# Patient Record
Sex: FEMALE | Race: WHITE | NOT HISPANIC OR LATINO | Employment: FULL TIME | URBAN - METROPOLITAN AREA
[De-identification: names, ages, dates, MRNs, and addresses within clinical notes are randomized per-mention and may not be internally consistent; named-entity substitution may affect disease eponyms.]

---

## 2019-03-08 ENCOUNTER — HOSPITAL ENCOUNTER (OUTPATIENT)
Dept: URGENT CARE | Facility: CLINIC | Age: 29
Discharge: HOME | End: 2019-03-08
Attending: FAMILY MEDICINE

## 2019-03-09 LAB — BACTERIA SPEC AEROBE CULT: NORMAL

## 2021-12-17 ENCOUNTER — HOSPITAL ENCOUNTER (EMERGENCY)
Facility: HOSPITAL | Age: 31
Discharge: HOME | End: 2021-12-17
Attending: EMERGENCY MEDICINE | Admitting: EMERGENCY MEDICINE

## 2021-12-17 VITALS
OXYGEN SATURATION: 98 % | HEIGHT: 64 IN | WEIGHT: 278.22 LBS | HEART RATE: 72 BPM | SYSTOLIC BLOOD PRESSURE: 141 MMHG | DIASTOLIC BLOOD PRESSURE: 95 MMHG | TEMPERATURE: 98.3 F | BODY MASS INDEX: 47.5 KG/M2 | RESPIRATION RATE: 20 BRPM

## 2021-12-17 DIAGNOSIS — M54.16 LUMBAR RADICULOPATHY: Primary | ICD-10-CM

## 2021-12-17 PROCEDURE — 99283 EMERGENCY DEPT VISIT LOW MDM: CPT

## 2021-12-17 PROCEDURE — 25010000002 DEXAMETHASONE PER 1 MG: Performed by: PHYSICIAN ASSISTANT

## 2021-12-17 PROCEDURE — 25010000002 KETOROLAC TROMETHAMINE PER 15 MG: Performed by: PHYSICIAN ASSISTANT

## 2021-12-17 PROCEDURE — 96372 THER/PROPH/DIAG INJ SC/IM: CPT

## 2021-12-17 RX ORDER — CYCLOBENZAPRINE HCL 10 MG
10 TABLET ORAL ONCE
Status: COMPLETED | OUTPATIENT
Start: 2021-12-17 | End: 2021-12-17

## 2021-12-17 RX ORDER — KETOROLAC TROMETHAMINE 30 MG/ML
30 INJECTION, SOLUTION INTRAMUSCULAR; INTRAVENOUS ONCE
Status: COMPLETED | OUTPATIENT
Start: 2021-12-17 | End: 2021-12-17

## 2021-12-17 RX ORDER — DEXAMETHASONE SODIUM PHOSPHATE 10 MG/ML
10 INJECTION INTRAMUSCULAR; INTRAVENOUS ONCE
Status: COMPLETED | OUTPATIENT
Start: 2021-12-17 | End: 2021-12-17

## 2021-12-17 RX ORDER — HYDROCODONE BITARTRATE AND ACETAMINOPHEN 10; 325 MG/1; MG/1
1 TABLET ORAL EVERY 6 HOURS PRN
Status: DISCONTINUED | OUTPATIENT
Start: 2021-12-17 | End: 2021-12-17 | Stop reason: HOSPADM

## 2021-12-17 RX ORDER — PREDNISONE 20 MG/1
20 TABLET ORAL DAILY
Qty: 5 TABLET | Refills: 0 | OUTPATIENT
Start: 2021-12-17 | End: 2022-01-26

## 2021-12-17 RX ORDER — CYCLOBENZAPRINE HCL 10 MG
10 TABLET ORAL 2 TIMES DAILY PRN
Qty: 30 TABLET | Refills: 0 | Status: SHIPPED | OUTPATIENT
Start: 2021-12-17 | End: 2022-03-03

## 2021-12-17 RX ORDER — KETOROLAC TROMETHAMINE 10 MG/1
10 TABLET, FILM COATED ORAL EVERY 6 HOURS PRN
Qty: 15 TABLET | Refills: 0 | Status: SHIPPED | OUTPATIENT
Start: 2021-12-17 | End: 2022-01-26

## 2021-12-17 RX ADMIN — DEXAMETHASONE SODIUM PHOSPHATE 10 MG: 10 INJECTION INTRAMUSCULAR; INTRAVENOUS at 18:01

## 2021-12-17 RX ADMIN — HYDROCODONE BITARTRATE AND ACETAMINOPHEN 1 TABLET: 10; 325 TABLET ORAL at 18:34

## 2021-12-17 RX ADMIN — CYCLOBENZAPRINE 10 MG: 10 TABLET, FILM COATED ORAL at 18:01

## 2021-12-17 RX ADMIN — KETOROLAC TROMETHAMINE 30 MG: 30 INJECTION, SOLUTION INTRAMUSCULAR; INTRAVENOUS at 18:02

## 2021-12-17 NOTE — ED PROVIDER NOTES
Subjective   Complains of right lower back pain/buttocks pain and sciatica. Hx of back surgery before L5/S1 Discectomy previously.  Denies urinary symptoms/saddle anesthesia like symptoms. Tried tylenol and ibuprofen at home and stretching without relief.       History provided by:  Patient  Back Pain  Location:  Lumbar spine  Quality:  Burning  Radiates to:  L foot  Pain severity:  Moderate  Onset quality:  Gradual  Duration:  3 days  Timing:  Intermittent  Progression:  Worsening  Chronicity:  Recurrent  Relieved by:  Nothing  Worsened by:  Nothing  Associated symptoms: no fever        Review of Systems   Constitutional: Negative for appetite change, chills, fatigue and fever.   HENT: Negative.    Eyes: Negative.  Negative for photophobia.   Respiratory: Negative.    Gastrointestinal: Negative.    Endocrine: Negative.    Genitourinary: Negative.    Musculoskeletal: Positive for back pain.   Skin: Negative.    Allergic/Immunologic: Negative.  Negative for immunocompromised state.   Neurological: Negative.    Hematological: Negative.    Psychiatric/Behavioral: Negative.    All other systems reviewed and are negative.      History reviewed. No pertinent past medical history.    No Known Allergies    Past Surgical History:   Procedure Laterality Date   • BACK SURGERY     • TONSILLECTOMY         History reviewed. No pertinent family history.    Social History     Socioeconomic History   • Marital status: Single   Tobacco Use   • Smoking status: Never Smoker   • Smokeless tobacco: Never Used   Vaping Use   • Vaping Use: Every day   Substance and Sexual Activity   • Alcohol use: Yes     Comment: Occasional   • Drug use: Never       Objective   Physical Exam  Vitals and nursing note reviewed.   Constitutional:       General: She is not in acute distress.     Appearance: Normal appearance. She is not ill-appearing or toxic-appearing.   HENT:      Head: Normocephalic.   Cardiovascular:      Rate and Rhythm: Normal rate and  regular rhythm.      Heart sounds: Normal heart sounds.   Pulmonary:      Effort: Pulmonary effort is normal.      Breath sounds: Normal breath sounds.   Musculoskeletal:        Arms:       Lumbar back: Tenderness present. Decreased range of motion.   Neurological:      Mental Status: She is alert.           MDM  Number of Diagnoses or Management Options  Risk of Complications, Morbidity, and/or Mortality  Presenting problems: moderate  Diagnostic procedures: moderate  Management options: moderate    Patient Progress  Patient progress: stable      Final diagnoses:   Lumbar radiculopathy       ED Disposition  ED Disposition     ED Disposition Condition Comment    Discharge Stable           Ree Cheek, JEROMY  [address] [address] [phone]    Schedule an appointment as soon as possible for a visit in 1 week  for further eval         Medication List      New Prescriptions    cyclobenzaprine 10 MG tablet  Commonly known as: FLEXERIL  Take 1 tablet by mouth 2 (Two) Times a Day As Needed for Muscle Spasms.     ketorolac 10 MG tablet  Commonly known as: TORADOL  Take 1 tablet by mouth Every 6 (Six) Hours As Needed for Moderate Pain .     predniSONE 20 MG tablet  Commonly known as: DELTASONE  Take 1 tablet by mouth Daily.           Where to Get Your Medications      These medications [place] [facility] [place] [id number] [facility] AT [facility] [address] - [phone] [facility] [phone] [address] [address]    Phone: [phone]   · cyclobenzaprine 10 MG tablet  · ketorolac 10 MG tablet  · predniSONE 20 MG tablet          Micah Alfonso PA  12/17/21 7795

## 2022-01-26 PROCEDURE — U0004 COV-19 TEST NON-CDC HGH THRU: HCPCS | Performed by: NURSE PRACTITIONER

## 2022-01-27 ENCOUNTER — TELEPHONE (OUTPATIENT)
Dept: URGENT CARE | Facility: CLINIC | Age: 32
End: 2022-01-27

## 2022-01-27 NOTE — TELEPHONE ENCOUNTER
----- Message from Narinder Mead MD sent at 1/27/2022  3:33 PM EST -----  Please call the patient regarding negative covid

## 2022-01-28 ENCOUNTER — HOSPITAL ENCOUNTER (EMERGENCY)
Facility: HOSPITAL | Age: 32
Discharge: HOME | End: 2022-01-28
Attending: EMERGENCY MEDICINE | Admitting: EMERGENCY MEDICINE

## 2022-01-28 ENCOUNTER — APPOINTMENT (OUTPATIENT)
Dept: GENERAL RADIOLOGY | Facility: HOSPITAL | Age: 32
End: 2022-01-28

## 2022-01-28 VITALS
RESPIRATION RATE: 18 BRPM | DIASTOLIC BLOOD PRESSURE: 86 MMHG | OXYGEN SATURATION: 98 % | HEIGHT: 64 IN | TEMPERATURE: 98 F | BODY MASS INDEX: 47.65 KG/M2 | WEIGHT: 279.1 LBS | SYSTOLIC BLOOD PRESSURE: 118 MMHG | HEART RATE: 70 BPM

## 2022-01-28 DIAGNOSIS — Z20.822 SUSPECTED COVID-19 VIRUS INFECTION: ICD-10-CM

## 2022-01-28 DIAGNOSIS — B34.9 VIRAL SYNDROME: Primary | ICD-10-CM

## 2022-01-28 LAB
ALBUMIN SERPL-MCNC: 4.7 G/DL (ref 3.5–5.2)
ALBUMIN/GLOB SERPL: 1.5 G/DL
ALP SERPL-CCNC: 76 U/L (ref 39–117)
ALT SERPL W P-5'-P-CCNC: 25 U/L (ref 1–33)
ANION GAP SERPL CALCULATED.3IONS-SCNC: 13.5 MMOL/L (ref 5–15)
AST SERPL-CCNC: 18 U/L (ref 1–32)
BASOPHILS # BLD AUTO: 0.02 10*3/MM3 (ref 0–0.2)
BASOPHILS NFR BLD AUTO: 0.2 % (ref 0–1.5)
BILIRUB SERPL-MCNC: 0.2 MG/DL (ref 0–1.2)
BUN SERPL-MCNC: 19 MG/DL (ref 6–20)
BUN/CREAT SERPL: 28.4 (ref 7–25)
CALCIUM SPEC-SCNC: 10.2 MG/DL (ref 8.6–10.5)
CHLORIDE SERPL-SCNC: 105 MMOL/L (ref 98–107)
CO2 SERPL-SCNC: 18.5 MMOL/L (ref 22–29)
CREAT SERPL-MCNC: 0.67 MG/DL (ref 0.57–1)
DEPRECATED RDW RBC AUTO: 41.8 FL (ref 37–54)
EOSINOPHIL # BLD AUTO: 0 10*3/MM3 (ref 0–0.4)
EOSINOPHIL NFR BLD AUTO: 0 % (ref 0.3–6.2)
ERYTHROCYTE [DISTWIDTH] IN BLOOD BY AUTOMATED COUNT: 14.1 % (ref 12.3–15.4)
FLUAV AG NPH QL: NEGATIVE
FLUBV AG NPH QL IA: NEGATIVE
GFR SERPL CREATININE-BSD FRML MDRD: 103 ML/MIN/1.73
GLOBULIN UR ELPH-MCNC: 3.2 GM/DL
GLUCOSE SERPL-MCNC: 124 MG/DL (ref 65–99)
HCT VFR BLD AUTO: 38.8 % (ref 34–46.6)
HGB BLD-MCNC: 13.1 G/DL (ref 12–15.9)
HOLD SPECIMEN: NORMAL
HOLD SPECIMEN: NORMAL
IMM GRANULOCYTES # BLD AUTO: 0.18 10*3/MM3 (ref 0–0.05)
IMM GRANULOCYTES NFR BLD AUTO: 2 % (ref 0–0.5)
LYMPHOCYTES # BLD AUTO: 1.19 10*3/MM3 (ref 0.7–3.1)
LYMPHOCYTES NFR BLD AUTO: 13.4 % (ref 19.6–45.3)
MCH RBC QN AUTO: 27.7 PG (ref 26.6–33)
MCHC RBC AUTO-ENTMCNC: 33.8 G/DL (ref 31.5–35.7)
MCV RBC AUTO: 82 FL (ref 79–97)
MONOCYTES # BLD AUTO: 0.13 10*3/MM3 (ref 0.1–0.9)
MONOCYTES NFR BLD AUTO: 1.5 % (ref 5–12)
NEUTROPHILS NFR BLD AUTO: 7.33 10*3/MM3 (ref 1.7–7)
NEUTROPHILS NFR BLD AUTO: 82.9 % (ref 42.7–76)
NRBC BLD AUTO-RTO: 0 /100 WBC (ref 0–0.2)
PLATELET # BLD AUTO: 198 10*3/MM3 (ref 140–450)
PMV BLD AUTO: 11.7 FL (ref 6–12)
POTASSIUM SERPL-SCNC: 4 MMOL/L (ref 3.5–5.2)
PROT SERPL-MCNC: 7.9 G/DL (ref 6–8.5)
RBC # BLD AUTO: 4.73 10*6/MM3 (ref 3.77–5.28)
SARS-COV-2 RNA PNL SPEC NAA+PROBE: NOT DETECTED
SODIUM SERPL-SCNC: 137 MMOL/L (ref 136–145)
WBC NRBC COR # BLD: 8.85 10*3/MM3 (ref 3.4–10.8)
WHOLE BLOOD HOLD SPECIMEN: NORMAL
WHOLE BLOOD HOLD SPECIMEN: NORMAL

## 2022-01-28 PROCEDURE — 87804 INFLUENZA ASSAY W/OPTIC: CPT | Performed by: EMERGENCY MEDICINE

## 2022-01-28 PROCEDURE — 71045 X-RAY EXAM CHEST 1 VIEW: CPT

## 2022-01-28 PROCEDURE — U0004 COV-19 TEST NON-CDC HGH THRU: HCPCS | Performed by: NURSE PRACTITIONER

## 2022-01-28 PROCEDURE — 80053 COMPREHEN METABOLIC PANEL: CPT

## 2022-01-28 PROCEDURE — 99283 EMERGENCY DEPT VISIT LOW MDM: CPT

## 2022-01-28 PROCEDURE — 85025 COMPLETE CBC W/AUTO DIFF WBC: CPT

## 2022-01-28 PROCEDURE — 94640 AIRWAY INHALATION TREATMENT: CPT

## 2022-01-28 RX ORDER — SODIUM CHLORIDE 0.9 % (FLUSH) 0.9 %
10 SYRINGE (ML) INJECTION AS NEEDED
Status: DISCONTINUED | OUTPATIENT
Start: 2022-01-28 | End: 2022-01-28

## 2022-01-28 RX ORDER — IPRATROPIUM BROMIDE AND ALBUTEROL SULFATE 2.5; .5 MG/3ML; MG/3ML
3 SOLUTION RESPIRATORY (INHALATION) ONCE
Status: COMPLETED | OUTPATIENT
Start: 2022-01-28 | End: 2022-01-28

## 2022-01-28 RX ORDER — BENZONATATE 200 MG/1
200 CAPSULE ORAL 3 TIMES DAILY PRN
Qty: 20 CAPSULE | Refills: 0 | Status: SHIPPED | OUTPATIENT
Start: 2022-01-28 | End: 2022-03-03

## 2022-01-28 RX ORDER — ALBUTEROL SULFATE 2.5 MG/3ML
2.5 SOLUTION RESPIRATORY (INHALATION) EVERY 4 HOURS PRN
Qty: 30 EACH | Refills: 0 | Status: SHIPPED | OUTPATIENT
Start: 2022-01-28 | End: 2022-03-03

## 2022-01-28 RX ADMIN — IPRATROPIUM BROMIDE AND ALBUTEROL SULFATE 3 ML: .5; 3 SOLUTION RESPIRATORY (INHALATION) at 17:24

## 2022-02-17 ENCOUNTER — OFFICE VISIT (OUTPATIENT)
Dept: NEUROSURGERY | Facility: CLINIC | Age: 32
End: 2022-02-17

## 2022-02-17 ENCOUNTER — PREP FOR SURGERY (OUTPATIENT)
Dept: OTHER | Facility: HOSPITAL | Age: 32
End: 2022-02-17

## 2022-02-17 VITALS — BODY MASS INDEX: 47.97 KG/M2 | HEIGHT: 64 IN | WEIGHT: 281 LBS

## 2022-02-17 DIAGNOSIS — M51.36 DEGENERATIVE DISC DISEASE, LUMBAR: ICD-10-CM

## 2022-02-17 DIAGNOSIS — M54.16 LUMBAR RADICULOPATHY: ICD-10-CM

## 2022-02-17 DIAGNOSIS — M51.26 LUMBAR DISC HERNIATION: Primary | ICD-10-CM

## 2022-02-17 PROCEDURE — 99215 OFFICE O/P EST HI 40 MIN: CPT | Performed by: NURSE PRACTITIONER

## 2022-02-17 RX ORDER — CEFAZOLIN SODIUM IN 0.9 % NACL 3 G/100 ML
3 INTRAVENOUS SOLUTION, PIGGYBACK (ML) INTRAVENOUS ONCE
Status: CANCELLED | OUTPATIENT
Start: 2022-02-17 | End: 2022-02-17

## 2022-02-17 RX ORDER — HYDROCODONE BITARTRATE AND ACETAMINOPHEN 5; 325 MG/1; MG/1
1 TABLET ORAL EVERY 6 HOURS PRN
Qty: 28 TABLET | Refills: 0 | Status: SHIPPED | OUTPATIENT
Start: 2022-02-17 | End: 2022-03-01 | Stop reason: SDUPTHER

## 2022-02-17 NOTE — PROGRESS NOTES
"Chief Complaint  Back Pain    Subjective          Jenni Reid who is a 31 y.o. year old female who presents to [facility] for evaluation of low back and right leg pain.     Patient presenting with concerns of low back and right leg pain, starting in December of 2021. She was sick with COVID, woke up with severe pain in the low back which then radiated into the right leg. Pt rates pain 8/10, which is constant. Increased activity, twisting, turning, bending will make the pain severe. Described as aching and throbbing. Pain does radiate into the right leg in a L5 distribution. Having pain into the lateral shin, numbness in the top of the foot. Pt has concerns of weakness in the foot, with limited dorsiflexion. Pt having urinary incontinence, has sensation. Denies problems with bowels.     Recent Interventions for Pain: Medication Management which was not very effective. She has taken Ibuprofen, was prescribed Diclofenac though this caused significant abdominal discomfort and she stopped this. She was prescribed robaxin which did not provide benefit. Currently taking Tylenol though this does not help much.     Prior Surgery: Prior discectomies at L4/5 and L5/S1 last in 2016 with Dr. Norwood, repeat discectomy L4/5 in 2016 with Dr. Aguilera    Pt is morbidly obese. BMI 48.23. She is a former smoker. She currently vapes.               Review of Systems   Genitourinary: Positive for urinary incontinence.   Musculoskeletal: Positive for arthralgias, back pain and gait problem.   Neurological: Positive for weakness and numbness.   All other systems reviewed and are negative.       Objective   Vital Signs:   Ht 162.6 cm (64\")   Wt 127 kg (281 lb)   BMI 48.23 kg/m²       Physical Exam  Vitals reviewed.   Constitutional:       Appearance: Normal appearance.   Musculoskeletal:      Lumbar back: Tenderness present. Positive right straight leg raise test. Negative left straight leg raise " test.   Neurological:      Mental Status: She is alert and oriented to person, place, and time.      Gait: Gait is intact.      Deep Tendon Reflexes:      Reflex Scores:       Patellar reflexes are 0 on the right side and 2+ on the left side.       Achilles reflexes are 2+ on the right side and 0 on the left side.       Neurologic Exam     Mental Status   Oriented to person, place, and time.   Level of consciousness: alert    Motor Exam   Muscle bulk: normal  Overall muscle tone: normal    Strength   Strength 5/5 except as noted.   Right peroneal: 4/5  Right gastroc: 3/5    Sensory Exam   Sensory deficit distribution on right: L5    Gait, Coordination, and Reflexes     Gait  Gait: normal    Reflexes   Right patellar: 0  Left patellar: 2+  Right achilles: 2+  Left achilles: 0       Result Review :{Labs  Result Review  Imaging  Med Tab  Media  Procedures :23}       Data reviewed: Radiologic studies MRI Lumbar Spine at [facility] on 1/24/22 reviewed. Large paracentral and foraminal disc protrusion measuring 2.4 cm resulting in severe right lateral recess stenosis and right foraminal stenosis with possible nerve impingement. At L5/S1 there is DDD with a disc protrusion without canal stenosis or nerve impingement.           Assessment and Plan    Diagnoses and all orders for this visit:    1. Lumbar disc herniation (Primary)  -     HYDROcodone-acetaminophen (NORCO) 5-325 MG per tablet; Take 1 tablet by mouth Every 6 (Six) Hours As Needed for Severe Pain .  Dispense: 28 tablet; Refill: 0    2. Lumbar radiculopathy    3. Degenerative disc disease, lumbar    Pt presenting with acute right lumbar radiculopathy with weakness and sensory deficit in the right lower extremity. We reviewed her MRI Lumbar Spine, revealing a large right paracentral and foraminal disc protrusion at L4/5. We discussed right approach minimally invasive discectomy at L4/5. We discussed 6 week recovery time of no lifting greater than 10  pounds and movement limitations. Dr. Smith discussed risks and benefits with patient. Dr. Smith discussed at length that she is high risk for recurrent disc herniation and complications due to morbid obesity. Dr. Smith discussed that he would recommend lumbar fusion though she would need to lose weight prior to consideration of this. Patient wishes to proceed with discectomy. Will start Norco 5/325 mg every 6 hours as needed for pain. Liang reviewed. She will follow up at 3 weeks post op.     I spent 45 minutes caring for Jenni on this date of service. This time includes time spent by me in the following activities:preparing for the visit, reviewing tests, obtaining and/or reviewing a separately obtained history, performing a medically appropriate examination and/or evaluation , counseling and educating the patient/family/caregiver, ordering medications, tests, or procedures, referring and communicating with other health care professionals , documenting information in the medical record, independently interpreting results and communicating that information with the patient/family/caregiver and care coordination.    Follow Up   Return for Post-op Follow up.  Patient was given instructions and counseling regarding her condition or for health maintenance advice.

## 2022-02-17 NOTE — PATIENT INSTRUCTIONS
-Norco 5/325 mg every 6 hours as needed for pain  -Right approach minimally invasive discectomy L4/L5  -Follow up post op

## 2022-02-23 PROBLEM — M51.26 LUMBAR DISC HERNIATION: Status: ACTIVE | Noted: 2022-02-23

## 2022-02-28 ENCOUNTER — TELEPHONE (OUTPATIENT)
Dept: NEUROSURGERY | Facility: CLINIC | Age: 32
End: 2022-02-28

## 2022-02-28 NOTE — TELEPHONE ENCOUNTER
PATIENT RAN OUT OF PAIN MEDS THIS MORNING AND HER SURGERY IS NOT SCHEDULED UNTIL NEXT WED.  PATIENT WOULD LIKE TO KNOW IF SHE CAN GET A REFIL;L UNTIL THEN.    HYDROCODONE 5/325MG    PLEASE CALL PATIENT AND ADVISE IF POSSIBLE    [phone]

## 2022-03-01 DIAGNOSIS — M51.26 LUMBAR DISC HERNIATION: ICD-10-CM

## 2022-03-01 RX ORDER — HYDROCODONE BITARTRATE AND ACETAMINOPHEN 5; 325 MG/1; MG/1
1 TABLET ORAL EVERY 6 HOURS PRN
Qty: 28 TABLET | Refills: 0 | Status: SHIPPED | OUTPATIENT
Start: 2022-03-01 | End: 2022-03-09 | Stop reason: HOSPADM

## 2022-03-01 NOTE — TELEPHONE ENCOUNTER
PT CALLED TO CHECK STATUS OF HER REFILL FOR HER PAIN MEDICATION-PT STATES SHE WAS TOLD SHE WOULD RECEIVE A CALL BACK-PT IS CALLING THE PHARMACY TO SEE IF MEDICATION WAS FILLED-PT WILL CALL BACK IF NEEDED.

## 2022-03-03 ENCOUNTER — TELEPHONE (OUTPATIENT)
Dept: NEUROSURGERY | Facility: CLINIC | Age: 32
End: 2022-03-03

## 2022-03-03 ENCOUNTER — OFFICE VISIT (OUTPATIENT)
Dept: FAMILY MEDICINE CLINIC | Facility: CLINIC | Age: 32
End: 2022-03-03

## 2022-03-03 ENCOUNTER — LAB (OUTPATIENT)
Dept: LAB | Facility: HOSPITAL | Age: 32
End: 2022-03-03

## 2022-03-03 VITALS
WEIGHT: 279.2 LBS | TEMPERATURE: 98 F | SYSTOLIC BLOOD PRESSURE: 132 MMHG | RESPIRATION RATE: 16 BRPM | HEART RATE: 90 BPM | OXYGEN SATURATION: 98 % | HEIGHT: 64 IN | BODY MASS INDEX: 47.66 KG/M2 | DIASTOLIC BLOOD PRESSURE: 88 MMHG

## 2022-03-03 DIAGNOSIS — Z01.89 ROUTINE LAB DRAW: ICD-10-CM

## 2022-03-03 DIAGNOSIS — Z00.00 ANNUAL PHYSICAL EXAM: ICD-10-CM

## 2022-03-03 DIAGNOSIS — Z11.59 ENCOUNTER FOR HEPATITIS C SCREENING TEST FOR LOW RISK PATIENT: ICD-10-CM

## 2022-03-03 DIAGNOSIS — E28.2 PCOS (POLYCYSTIC OVARIAN SYNDROME): ICD-10-CM

## 2022-03-03 DIAGNOSIS — F41.9 ANXIETY: Primary | ICD-10-CM

## 2022-03-03 DIAGNOSIS — R53.83 FATIGUE, UNSPECIFIED TYPE: ICD-10-CM

## 2022-03-03 DIAGNOSIS — R73.01 IMPAIRED FASTING GLUCOSE: ICD-10-CM

## 2022-03-03 DIAGNOSIS — Z13.220 SCREENING FOR LIPID DISORDERS: ICD-10-CM

## 2022-03-03 DIAGNOSIS — F33.1 MODERATE EPISODE OF RECURRENT MAJOR DEPRESSIVE DISORDER: ICD-10-CM

## 2022-03-03 PROBLEM — M51.26 HNP (HERNIATED NUCLEUS PULPOSUS), LUMBAR: Status: ACTIVE | Noted: 2017-09-25

## 2022-03-03 PROBLEM — M54.30 SCIATICA: Status: ACTIVE | Noted: 2022-03-03

## 2022-03-03 PROBLEM — R32 URINARY INCONTINENCE: Status: ACTIVE | Noted: 2022-03-03

## 2022-03-03 LAB
ALBUMIN SERPL-MCNC: 4.5 G/DL (ref 3.5–5.2)
ALBUMIN/GLOB SERPL: 1.9 G/DL
ALP SERPL-CCNC: 59 U/L (ref 39–117)
ALT SERPL W P-5'-P-CCNC: 30 U/L (ref 1–33)
ANION GAP SERPL CALCULATED.3IONS-SCNC: 9.6 MMOL/L (ref 5–15)
AST SERPL-CCNC: 22 U/L (ref 1–32)
BASOPHILS # BLD AUTO: 0.04 10*3/MM3 (ref 0–0.2)
BASOPHILS NFR BLD AUTO: 0.6 % (ref 0–1.5)
BILIRUB SERPL-MCNC: 0.3 MG/DL (ref 0–1.2)
BUN SERPL-MCNC: 14 MG/DL (ref 6–20)
BUN/CREAT SERPL: 16.9 (ref 7–25)
CALCIUM SPEC-SCNC: 9.4 MG/DL (ref 8.6–10.5)
CHLORIDE SERPL-SCNC: 107 MMOL/L (ref 98–107)
CHOLEST SERPL-MCNC: 152 MG/DL (ref 0–200)
CO2 SERPL-SCNC: 21.4 MMOL/L (ref 22–29)
CREAT SERPL-MCNC: 0.83 MG/DL (ref 0.57–1)
DEPRECATED RDW RBC AUTO: 43.3 FL (ref 37–54)
EGFRCR SERPLBLD CKD-EPI 2021: 97.4 ML/MIN/1.73
EOSINOPHIL # BLD AUTO: 0.2 10*3/MM3 (ref 0–0.4)
EOSINOPHIL NFR BLD AUTO: 3.1 % (ref 0.3–6.2)
ERYTHROCYTE [DISTWIDTH] IN BLOOD BY AUTOMATED COUNT: 14.2 % (ref 12.3–15.4)
GLOBULIN UR ELPH-MCNC: 2.4 GM/DL
GLUCOSE SERPL-MCNC: 92 MG/DL (ref 65–99)
HBA1C MFR BLD: 5.2 % (ref 4.8–5.6)
HCT VFR BLD AUTO: 40.3 % (ref 34–46.6)
HCV AB SER DONR QL: NORMAL
HDLC SERPL-MCNC: 43 MG/DL (ref 40–60)
HGB BLD-MCNC: 13.2 G/DL (ref 12–15.9)
IMM GRANULOCYTES # BLD AUTO: 0.02 10*3/MM3 (ref 0–0.05)
IMM GRANULOCYTES NFR BLD AUTO: 0.3 % (ref 0–0.5)
LDLC SERPL CALC-MCNC: 89 MG/DL (ref 0–100)
LDLC/HDLC SERPL: 2.03 {RATIO}
LYMPHOCYTES # BLD AUTO: 2.13 10*3/MM3 (ref 0.7–3.1)
LYMPHOCYTES NFR BLD AUTO: 33.5 % (ref 19.6–45.3)
MCH RBC QN AUTO: 27.5 PG (ref 26.6–33)
MCHC RBC AUTO-ENTMCNC: 32.8 G/DL (ref 31.5–35.7)
MCV RBC AUTO: 84 FL (ref 79–97)
MONOCYTES # BLD AUTO: 0.3 10*3/MM3 (ref 0.1–0.9)
MONOCYTES NFR BLD AUTO: 4.7 % (ref 5–12)
NEUTROPHILS NFR BLD AUTO: 3.66 10*3/MM3 (ref 1.7–7)
NEUTROPHILS NFR BLD AUTO: 57.8 % (ref 42.7–76)
NRBC BLD AUTO-RTO: 0 /100 WBC (ref 0–0.2)
PLATELET # BLD AUTO: 216 10*3/MM3 (ref 140–450)
PMV BLD AUTO: 12.2 FL (ref 6–12)
POTASSIUM SERPL-SCNC: 4.5 MMOL/L (ref 3.5–5.2)
PROT SERPL-MCNC: 6.9 G/DL (ref 6–8.5)
RBC # BLD AUTO: 4.8 10*6/MM3 (ref 3.77–5.28)
SODIUM SERPL-SCNC: 138 MMOL/L (ref 136–145)
TRIGL SERPL-MCNC: 108 MG/DL (ref 0–150)
TSH SERPL DL<=0.05 MIU/L-ACNC: 1.92 UIU/ML (ref 0.27–4.2)
VLDLC SERPL-MCNC: 20 MG/DL (ref 5–40)
WBC NRBC COR # BLD: 6.35 10*3/MM3 (ref 3.4–10.8)

## 2022-03-03 PROCEDURE — 36415 COLL VENOUS BLD VENIPUNCTURE: CPT

## 2022-03-03 PROCEDURE — 86803 HEPATITIS C AB TEST: CPT

## 2022-03-03 PROCEDURE — 99204 OFFICE O/P NEW MOD 45 MIN: CPT

## 2022-03-03 PROCEDURE — 84443 ASSAY THYROID STIM HORMONE: CPT

## 2022-03-03 PROCEDURE — 80061 LIPID PANEL: CPT

## 2022-03-03 PROCEDURE — 83036 HEMOGLOBIN GLYCOSYLATED A1C: CPT

## 2022-03-03 PROCEDURE — 80053 COMPREHEN METABOLIC PANEL: CPT

## 2022-03-03 PROCEDURE — 85025 COMPLETE CBC W/AUTO DIFF WBC: CPT

## 2022-03-03 RX ORDER — HYDROXYZINE HYDROCHLORIDE 25 MG/1
25 TABLET, FILM COATED ORAL 3 TIMES DAILY PRN
Qty: 90 TABLET | Refills: 2 | Status: SHIPPED | OUTPATIENT
Start: 2022-03-03 | End: 2022-09-01 | Stop reason: SDUPTHER

## 2022-03-03 NOTE — PRE-PROCEDURE INSTRUCTIONS
PRE-OP INSTRUCTIONS REVIEWED WITH PATIENT: FASTING/BATHING/ARRIVAL PROCEDURES.  INSTRUCTED TO TAKE A.M. DAY OF SURGERY: OVIDIO TERAN  UNDERSTANDING VERBALIZED.

## 2022-03-03 NOTE — PROGRESS NOTES
Jenni Reid presents to [facility] with complaints of worsening anxiety/depression, upcoming surgery for lower back pain, also increased fatigue and would like some labs checked.  Patient is also here to establish as new patient.      History of Present Illness  This is a 31-year-old female, past medical history significant for lower back pain, anxiety, depression, PCOS, who presents to the clinic with complaints of worsening anxiety/depression, upcoming surgery for her lower back pain, and also increased fatigue.    Lower back pain: Patient states that she is battled with this for several years, states that she is about to have her third back surgery by Dr. Smith next Wednesday, and that hopefully she will have some relieved symptoms by that time.  Patient states she is tried several other things for this, has been seeing neurosurgery for years.  Patient is currently [facility], states that it does okay, but she is ready to see how the surgery does.    Anxiety/depression: Patient states that she has had a really rough few days, states that her brother did try to commit suicide yesterday, and since that time she is really had some issues.  Patient states before that though she was wanting to possibly discuss about getting on Zoloft as she has had worsening depression/anxiety.  Patient has tried Wellbutrin in the past, did not do well with this, states that it made her crazy.  Patient also was wondering if she could have something in the meantime in order to help with the acute anxiety/panic attacks.  Patient denies any suicidal thoughts/ideation.    PCOS: Patient states that she is had issues with this in the past also, at one time was started on Metformin, states she did not do well with this.  Patient states she had severe GI issues as well as it made her periods very irregular and almost nonexistent.  Patient states since taking this, she has never had a regular period, states  that her periods will last 2 to 3 days, and it is more like spotting, and then that will be it.  Patient denies any severe pelvic pain, or other symptoms.  Patient has not seen a OB/GYN in some time.    Patient also states that she is got a pretty extensive family history of thyroid problems.  Patient's not had a full panel labs drawn in the past 3 to 4 years.  Patient does admit to some extreme fatigue, states that she does have also some issues with intolerance to cold as well as having a hard time losing weight.  Patient would like us to draw full panel for this.    Patient states that she is not due for another Pap smear until 2024, does not wish to have the flu vaccine, will get the tetanus shot at her next visit as well as the Covid shot her next visit.  Otherwise patient is up-to-date.    Past Medical History:   Diagnosis Date   • Allergic    • Anxiety    • Depression    • Injury of back     LUMBAR 4/5   • PCOS (polycystic ovarian syndrome)    • Sciatica of right side    • Seasonal allergies      Past Surgical History:   • ADENOIDECTOMY   • BACK SURGERY    DISCECTOMY X2 LUMBAR 5 S1   • TONSILLECTOMY       Social History     Socioeconomic History   • Marital status: Single   Tobacco Use   • Smoking status: Former Smoker     Packs/day: 1.00     Years: 10.00     Pack years: 10.00     Types: Cigarettes     Quit date: 1/1/2019     Years since quitting: 3.1   • Smokeless tobacco: Never Used   • Tobacco comment: Pt quit smoking 2019   Vaping Use   • Vaping Use: Every day   • Substances: Nicotine, INST PER ANESTHESIA PROTOCOL   • Devices: Disposable   Substance and Sexual Activity   • Alcohol use: Yes     Comment: Occasional   • Drug use: Never   • Sexual activity: Yes     Partners: Female     Socioeconomic History   • Marital status: Single   Tobacco Use   • Smoking status: Former Smoker     Packs/day: 1.00     Years: 10.00     Pack years: 10.00     Types: Cigarettes     Quit date: 1/1/2019     Years since quitting:  "3.1   • Smokeless tobacco: Never Used   • Tobacco comment: Pt quit smoking 2019   Vaping Use   • Vaping Use: Every day   • Substances: Nicotine, INST PER ANESTHESIA PROTOCOL   • Devices: Disposable   Substance and Sexual Activity   • Alcohol use: Yes     Comment: Occasional   • Drug use: Never   • Sexual activity: Yes     Partners: Female      Problem Relation Age of Onset   • Malig Hyperthermia Neg Hx          Objective   Vital Signs:   /88   Pulse 90   Temp 98 °F (36.7 °C)   Resp 16   Ht 162.6 cm (64\")   Wt 127 kg (279 lb 3.2 oz)   SpO2 98%   BMI 47.92 kg/m²     Body mass index is 47.92 kg/m².    All labs, imaging, test results, and specialty provider notes reviewed with patient.       Physical Exam  Vitals reviewed.   Constitutional:       Appearance: Normal appearance.   Cardiovascular:      Rate and Rhythm: Normal rate and regular rhythm.      Pulses: Normal pulses.      Heart sounds: Normal heart sounds.   Pulmonary:      Effort: Pulmonary effort is normal.      Breath sounds: Normal breath sounds.   Neurological:      General: No focal deficit present.      Mental Status: She is alert and oriented to person, place, and time.            Assessment and Plan:  Diagnoses and all orders for this visit:    1. Anxiety (Primary)  Comments:  We will start patient on Zoloft, instructed her not to take this until after her surgery next week.  We will also give her hydroxyzine for acute anxiety.   Orders:  -     hydrOXYzine (ATARAX) 25 MG tablet; Take 1 tablet by mouth 3 (Three) Times a Day As Needed for Anxiety.  Dispense: 90 tablet; Refill: 2  -     sertraline (Zoloft) 50 MG tablet; Take 1 tablet by mouth Daily.  Dispense: 30 tablet; Refill: 2    2. Moderate episode of recurrent major depressive disorder (HCC)  -     hydrOXYzine (ATARAX) 25 MG tablet; Take 1 tablet by mouth 3 (Three) Times a Day As Needed for Anxiety.  Dispense: 90 tablet; Refill: 2  -     sertraline (Zoloft) 50 MG tablet; Take 1 tablet by " mouth Daily.  Dispense: 30 tablet; Refill: 2    3. Routine lab draw  -     CBC Auto Differential; Future  -     Comprehensive Metabolic Panel; Future  -     TSH Rfx On Abnormal To Free T4; Future    4. Screening for lipid disorders  -     Lipid Panel; Future    5. Encounter for hepatitis C screening test for low risk patient  -     Hepatitis C Antibody; Future    6. Fatigue, unspecified type  Comments:  Will check for any thyroid issues, as well as check for antibodies.  Also perform full panel labs.  Orders:  -     Thyroid Antibodies; Future    7. Impaired fasting glucose  Comments:  Check hemoglobin A1c.  If it is high, will consider other medications not Metformin as patient was then unable to tolerate this in the past.  Orders:  -     Hemoglobin A1c; Future    8. PCOS (polycystic ovarian syndrome)  Comments:  Currently stable.  Patient not having any current symptoms.    9. Annual physical exam          Follow Up:  Return in about 4 weeks (around 3/31/2022).    Patient was given instructions and counseling regarding her condition or for health maintenance advice. Please see specific information pulled into the AVS if appropriate.

## 2022-03-09 ENCOUNTER — ANESTHESIA (OUTPATIENT)
Dept: PERIOP | Facility: HOSPITAL | Age: 32
End: 2022-03-09

## 2022-03-09 ENCOUNTER — HOSPITAL ENCOUNTER (OUTPATIENT)
Facility: HOSPITAL | Age: 32
Setting detail: HOSPITAL OUTPATIENT SURGERY
Discharge: HOME | End: 2022-03-09
Attending: NEUROLOGICAL SURGERY | Admitting: NEUROLOGICAL SURGERY

## 2022-03-09 ENCOUNTER — ANESTHESIA EVENT (OUTPATIENT)
Dept: PERIOP | Facility: HOSPITAL | Age: 32
End: 2022-03-09

## 2022-03-09 ENCOUNTER — APPOINTMENT (OUTPATIENT)
Dept: GENERAL RADIOLOGY | Facility: HOSPITAL | Age: 32
End: 2022-03-09

## 2022-03-09 VITALS
DIASTOLIC BLOOD PRESSURE: 64 MMHG | HEART RATE: 58 BPM | TEMPERATURE: 97.2 F | BODY MASS INDEX: 47.12 KG/M2 | HEIGHT: 64 IN | WEIGHT: 276.02 LBS | SYSTOLIC BLOOD PRESSURE: 116 MMHG | RESPIRATION RATE: 16 BRPM | OXYGEN SATURATION: 97 %

## 2022-03-09 DIAGNOSIS — M51.26 LUMBAR DISC HERNIATION: ICD-10-CM

## 2022-03-09 LAB — B-HCG UR QL: NEGATIVE

## 2022-03-09 PROCEDURE — 81025 URINE PREGNANCY TEST: CPT | Performed by: NEUROLOGICAL SURGERY

## 2022-03-09 PROCEDURE — 25010000002 METHYLPREDNISOLONE PER 40 MG: Performed by: NEUROLOGICAL SURGERY

## 2022-03-09 PROCEDURE — 25010000002 ONDANSETRON PER 1 MG: Performed by: NURSE ANESTHETIST, CERTIFIED REGISTERED

## 2022-03-09 PROCEDURE — 25010000002 HYDROMORPHONE 1 MG/ML SOLUTION: Performed by: NURSE ANESTHETIST, CERTIFIED REGISTERED

## 2022-03-09 PROCEDURE — C1889 IMPLANT/INSERT DEVICE, NOC: HCPCS | Performed by: NEUROLOGICAL SURGERY

## 2022-03-09 PROCEDURE — 25010000002 PROPOFOL 10 MG/ML EMULSION: Performed by: NURSE ANESTHETIST, CERTIFIED REGISTERED

## 2022-03-09 PROCEDURE — 63042 LAMINOTOMY SINGLE LUMBAR: CPT | Performed by: SPECIALIST/TECHNOLOGIST, OTHER

## 2022-03-09 PROCEDURE — 76000 FLUOROSCOPY <1 HR PHYS/QHP: CPT

## 2022-03-09 PROCEDURE — 63042 LAMINOTOMY SINGLE LUMBAR: CPT | Performed by: NEUROLOGICAL SURGERY

## 2022-03-09 PROCEDURE — 25010000002 MIDAZOLAM PER 1 MG: Performed by: ANESTHESIOLOGY

## 2022-03-09 PROCEDURE — 25010000002 CEFAZOLIN PER 500 MG: Performed by: NEUROLOGICAL SURGERY

## 2022-03-09 PROCEDURE — 25010000002 FENTANYL CITRATE (PF) 50 MCG/ML SOLUTION: Performed by: NURSE ANESTHETIST, CERTIFIED REGISTERED

## 2022-03-09 PROCEDURE — 25010000002 KETOROLAC TROMETHAMINE PER 15 MG: Performed by: STUDENT IN AN ORGANIZED HEALTH CARE EDUCATION/TRAINING PROGRAM

## 2022-03-09 PROCEDURE — 0 MEPERIDINE PER 100 MG: Performed by: NURSE ANESTHETIST, CERTIFIED REGISTERED

## 2022-03-09 RX ORDER — KETOROLAC TROMETHAMINE 30 MG/ML
30 INJECTION, SOLUTION INTRAMUSCULAR; INTRAVENOUS ONCE
Status: COMPLETED | OUTPATIENT
Start: 2022-03-09 | End: 2022-03-09

## 2022-03-09 RX ORDER — OXYCODONE HYDROCHLORIDE AND ACETAMINOPHEN 5; 325 MG/1; MG/1
1 TABLET ORAL EVERY 6 HOURS PRN
Qty: 25 TABLET | Refills: 0 | Status: SHIPPED | OUTPATIENT
Start: 2022-03-09 | End: 2022-03-14 | Stop reason: SDUPTHER

## 2022-03-09 RX ORDER — ONDANSETRON 2 MG/ML
4 INJECTION INTRAMUSCULAR; INTRAVENOUS ONCE AS NEEDED
Status: DISCONTINUED | OUTPATIENT
Start: 2022-03-09 | End: 2022-03-09 | Stop reason: HOSPADM

## 2022-03-09 RX ORDER — BUPIVACAINE HYDROCHLORIDE AND EPINEPHRINE 5; 5 MG/ML; UG/ML
INJECTION, SOLUTION EPIDURAL; INTRACAUDAL; PERINEURAL AS NEEDED
Status: DISCONTINUED | OUTPATIENT
Start: 2022-03-09 | End: 2022-03-09 | Stop reason: HOSPADM

## 2022-03-09 RX ORDER — ROCURONIUM BROMIDE 10 MG/ML
INJECTION, SOLUTION INTRAVENOUS AS NEEDED
Status: DISCONTINUED | OUTPATIENT
Start: 2022-03-09 | End: 2022-03-09 | Stop reason: SURG

## 2022-03-09 RX ORDER — PROPOFOL 10 MG/ML
VIAL (ML) INTRAVENOUS AS NEEDED
Status: DISCONTINUED | OUTPATIENT
Start: 2022-03-09 | End: 2022-03-09 | Stop reason: SURG

## 2022-03-09 RX ORDER — MAGNESIUM HYDROXIDE 1200 MG/15ML
LIQUID ORAL AS NEEDED
Status: DISCONTINUED | OUTPATIENT
Start: 2022-03-09 | End: 2022-03-09 | Stop reason: HOSPADM

## 2022-03-09 RX ORDER — GLYCOPYRROLATE 0.2 MG/ML
0.2 INJECTION INTRAMUSCULAR; INTRAVENOUS
Status: COMPLETED | OUTPATIENT
Start: 2022-03-09 | End: 2022-03-09

## 2022-03-09 RX ORDER — MIDAZOLAM HYDROCHLORIDE 1 MG/ML
2 INJECTION INTRAMUSCULAR; INTRAVENOUS ONCE
Status: COMPLETED | OUTPATIENT
Start: 2022-03-09 | End: 2022-03-09

## 2022-03-09 RX ORDER — LIDOCAINE HYDROCHLORIDE 20 MG/ML
INJECTION, SOLUTION INFILTRATION; PERINEURAL AS NEEDED
Status: DISCONTINUED | OUTPATIENT
Start: 2022-03-09 | End: 2022-03-09 | Stop reason: SURG

## 2022-03-09 RX ORDER — FENTANYL CITRATE 50 UG/ML
INJECTION, SOLUTION INTRAMUSCULAR; INTRAVENOUS AS NEEDED
Status: DISCONTINUED | OUTPATIENT
Start: 2022-03-09 | End: 2022-03-09 | Stop reason: SURG

## 2022-03-09 RX ORDER — PROMETHAZINE HYDROCHLORIDE 12.5 MG/1
25 TABLET ORAL ONCE AS NEEDED
Status: DISCONTINUED | OUTPATIENT
Start: 2022-03-09 | End: 2022-03-09 | Stop reason: HOSPADM

## 2022-03-09 RX ORDER — DEXMEDETOMIDINE HYDROCHLORIDE 100 UG/ML
INJECTION, SOLUTION INTRAVENOUS AS NEEDED
Status: DISCONTINUED | OUTPATIENT
Start: 2022-03-09 | End: 2022-03-09 | Stop reason: SURG

## 2022-03-09 RX ORDER — SODIUM CHLORIDE, SODIUM LACTATE, POTASSIUM CHLORIDE, CALCIUM CHLORIDE 600; 310; 30; 20 MG/100ML; MG/100ML; MG/100ML; MG/100ML
9 INJECTION, SOLUTION INTRAVENOUS CONTINUOUS PRN
Status: DISCONTINUED | OUTPATIENT
Start: 2022-03-09 | End: 2022-03-09 | Stop reason: HOSPADM

## 2022-03-09 RX ORDER — PROMETHAZINE HYDROCHLORIDE 25 MG/1
25 SUPPOSITORY RECTAL ONCE AS NEEDED
Status: DISCONTINUED | OUTPATIENT
Start: 2022-03-09 | End: 2022-03-09 | Stop reason: HOSPADM

## 2022-03-09 RX ORDER — ONDANSETRON 2 MG/ML
INJECTION INTRAMUSCULAR; INTRAVENOUS AS NEEDED
Status: DISCONTINUED | OUTPATIENT
Start: 2022-03-09 | End: 2022-03-09 | Stop reason: SURG

## 2022-03-09 RX ORDER — OXYCODONE HYDROCHLORIDE 5 MG/1
5 TABLET ORAL
Status: COMPLETED | OUTPATIENT
Start: 2022-03-09 | End: 2022-03-09

## 2022-03-09 RX ORDER — ACETAMINOPHEN 500 MG
1000 TABLET ORAL ONCE
Status: COMPLETED | OUTPATIENT
Start: 2022-03-09 | End: 2022-03-09

## 2022-03-09 RX ORDER — MEPERIDINE HYDROCHLORIDE 25 MG/ML
12.5 INJECTION INTRAMUSCULAR; INTRAVENOUS; SUBCUTANEOUS
Status: DISCONTINUED | OUTPATIENT
Start: 2022-03-09 | End: 2022-03-09 | Stop reason: HOSPADM

## 2022-03-09 RX ORDER — CEFAZOLIN SODIUM IN 0.9 % NACL 3 G/100 ML
3 INTRAVENOUS SOLUTION, PIGGYBACK (ML) INTRAVENOUS ONCE
Status: COMPLETED | OUTPATIENT
Start: 2022-03-09 | End: 2022-03-09

## 2022-03-09 RX ORDER — METHYLPREDNISOLONE ACETATE 40 MG/ML
INJECTION, SUSPENSION INTRA-ARTICULAR; INTRALESIONAL; INTRAMUSCULAR; SOFT TISSUE AS NEEDED
Status: DISCONTINUED | OUTPATIENT
Start: 2022-03-09 | End: 2022-03-09 | Stop reason: HOSPADM

## 2022-03-09 RX ORDER — PHENYLEPHRINE HCL IN 0.9% NACL 1 MG/10 ML
SYRINGE (ML) INTRAVENOUS AS NEEDED
Status: DISCONTINUED | OUTPATIENT
Start: 2022-03-09 | End: 2022-03-09 | Stop reason: SURG

## 2022-03-09 RX ADMIN — MIDAZOLAM HYDROCHLORIDE 2 MG: 1 INJECTION, SOLUTION INTRAMUSCULAR; INTRAVENOUS at 11:13

## 2022-03-09 RX ADMIN — LIDOCAINE HYDROCHLORIDE 100 MG: 20 INJECTION, SOLUTION INFILTRATION; PERINEURAL at 12:44

## 2022-03-09 RX ADMIN — FENTANYL CITRATE 50 MCG: 50 INJECTION, SOLUTION INTRAMUSCULAR; INTRAVENOUS at 11:54

## 2022-03-09 RX ADMIN — PROPOFOL 200 MG: 10 INJECTION, EMULSION INTRAVENOUS at 11:22

## 2022-03-09 RX ADMIN — ROCURONIUM BROMIDE 25 MG: 10 INJECTION INTRAVENOUS at 11:43

## 2022-03-09 RX ADMIN — KETOROLAC TROMETHAMINE 30 MG: 30 INJECTION, SOLUTION INTRAMUSCULAR; INTRAVENOUS at 14:14

## 2022-03-09 RX ADMIN — HYDROMORPHONE HYDROCHLORIDE 0.5 MG: 1 INJECTION, SOLUTION INTRAMUSCULAR; INTRAVENOUS; SUBCUTANEOUS at 13:05

## 2022-03-09 RX ADMIN — Medication 3 G: at 11:24

## 2022-03-09 RX ADMIN — MEPERIDINE HYDROCHLORIDE 12.5 MG: 25 INJECTION INTRAMUSCULAR; INTRAVENOUS; SUBCUTANEOUS at 13:18

## 2022-03-09 RX ADMIN — HYDROMORPHONE HYDROCHLORIDE 0.5 MG: 1 INJECTION, SOLUTION INTRAMUSCULAR; INTRAVENOUS; SUBCUTANEOUS at 13:12

## 2022-03-09 RX ADMIN — ROCURONIUM BROMIDE 50 MG: 10 INJECTION INTRAVENOUS at 11:23

## 2022-03-09 RX ADMIN — ONDANSETRON 4 MG: 2 INJECTION INTRAMUSCULAR; INTRAVENOUS at 12:39

## 2022-03-09 RX ADMIN — DEXMEDETOMIDINE HYDROCHLORIDE 15 MCG: 100 INJECTION, SOLUTION, CONCENTRATE INTRAVENOUS at 11:37

## 2022-03-09 RX ADMIN — Medication 100 MCG: at 12:05

## 2022-03-09 RX ADMIN — ACETAMINOPHEN 1000 MG: 500 TABLET ORAL at 10:02

## 2022-03-09 RX ADMIN — OXYCODONE HYDROCHLORIDE 5 MG: 5 TABLET ORAL at 13:39

## 2022-03-09 RX ADMIN — SODIUM CHLORIDE, POTASSIUM CHLORIDE, SODIUM LACTATE AND CALCIUM CHLORIDE 9 ML/HR: 600; 310; 30; 20 INJECTION, SOLUTION INTRAVENOUS at 09:58

## 2022-03-09 RX ADMIN — GLYCOPYRROLATE 0.2 MG: 0.2 INJECTION, SOLUTION INTRAMUSCULAR; INTRAVENOUS at 11:13

## 2022-03-09 RX ADMIN — LIDOCAINE HYDROCHLORIDE 100 MG: 20 INJECTION, SOLUTION INFILTRATION; PERINEURAL at 11:22

## 2022-03-09 RX ADMIN — DEXMEDETOMIDINE HYDROCHLORIDE 10 MCG: 100 INJECTION, SOLUTION, CONCENTRATE INTRAVENOUS at 11:54

## 2022-03-09 RX ADMIN — OXYCODONE HYDROCHLORIDE 5 MG: 5 TABLET ORAL at 13:24

## 2022-03-09 RX ADMIN — FENTANYL CITRATE 50 MCG: 50 INJECTION, SOLUTION INTRAMUSCULAR; INTRAVENOUS at 11:22

## 2022-03-09 RX ADMIN — SUGAMMADEX 200 MG: 100 INJECTION, SOLUTION INTRAVENOUS at 12:44

## 2022-03-09 RX ADMIN — DEXMEDETOMIDINE HYDROCHLORIDE 15 MCG: 100 INJECTION, SOLUTION, CONCENTRATE INTRAVENOUS at 11:41

## 2022-03-09 NOTE — ANESTHESIA PREPROCEDURE EVALUATION
Anesthesia Evaluation     Patient summary reviewed and Nursing notes reviewed                Airway   Mallampati: I  TM distance: >3 FB  Neck ROM: full  Possible difficult intubation and Large neck circumference  Dental      Pulmonary - negative pulmonary ROS and normal exam    breath sounds clear to auscultation  Cardiovascular - negative cardio ROS and normal exam    Rhythm: regular  Rate: normal        Neuro/Psych- negative ROS  GI/Hepatic/Renal/Endo    (+) morbid obesity,      Musculoskeletal (-) negative ROS    Abdominal    Substance History - negative use     OB/GYN negative ob/gyn ROS         Other                        Anesthesia Plan    ASA 2     general     intravenous induction     Anesthetic plan, all risks, benefits, and alternatives have been provided, discussed and informed consent has been obtained with: patient.        CODE STATUS:

## 2022-03-09 NOTE — ADDENDUM NOTE
Addendum  created 03/09/22 1410 by Lindsay Rutherford, DO    Order list changed, Pharmacy for encounter modified

## 2022-03-09 NOTE — DISCHARGE INSTRUCTIONS
DISCHARGE INSTRUCTIONS  DISCECTOMY/ LAMINECTOMY  [x] MINIMALLY INVASIVE      For your surgery you had:  General anesthesia (you may have a sore throat for the first 24 hours)  You may experience dizziness, drowsiness, or light-headedness for several hours following surgery  Do not stay alone today or tonight.  Limit your activity for 24 hours.  You should not drive, operate machinery, drink alcohol, or sign legally binding documents for 24 hours or while you are taking pain medication.  Activity  For the first two weeks following surgery, remain close to home and do not do any lifting, bending or strenuous activity.  Walking is the best exercise and you can increase the amount you walk as you feel like it.  Riding in a car for short distances (15-20 minutes) is okay but do not drive until you are off all narcotic medications.  If you have a sedentary job, you may resume work as soon as you feel like it (this is rarely less than one week).  Pain Control  Take your pain medicines as needed and as prescribed.  As you are feeling better, you can decrease the prescribed pain medication and take over-the-counter medications such as Tylenol or Ibuprofen.  The prescribed pain medicines tend to be constipating so it is important to eat a well-balanced diet and take a stool softener if recommended by the doctor.  Activity such as walking also helps keep your bowels regular.   Incision Care  Your sutures are under the skin and will dissolve in time.  You may shower as soon as you like.    [x] You have a clear dressing, which you should remove in 3-4 days.  Beneath this dressing are steri-strips that will peel off over time.  If these steri-strips have not peeled off in 10-14 days, you may remove them.  Gently washing your incision with mild soap and rinsing with water during your shower is all you need to do to care for the incision.  Do not scrub the incision or sit in the bathtub.  Check your incision site each day to see  how it looks.  Some redness and bruising is normal for the first few days.  NOTIFY YOUR DOCTOR IF YOU EXPERIENCE ANY OF THE FOLLOWING:   You have a fever over 100.8o Fahrenheit orally  Shaking chills  Your incision is red, hot to touch, or has excessive drainage  Your incision starts to separate  Increase in bleeding or bleeding that is excessive  Nausea, vomiting and/or pain not controlled by prescribed medications  Unable to urinate in 6 hours after surgery  You may contact 's clinic at [phone] with any questions or concerns.  If unable to reach your doctor, please go to the closest emergency room.      Last dose of pain medication was given at:    Oxycodone last at 1:39pm, may take percocet next at 5:39pm if needed.  Toradol last at 2:15p, may take ibuprofen next at 8:15pm if needed.

## 2022-03-09 NOTE — ANESTHESIA POSTPROCEDURE EVALUATION
Patient: Jenni Reid    Procedure Summary     Date: 03/09/22 Room / Location: [facility] OR 05 / [facility] MAIN OR    Anesthesia Start: 1118 Anesthesia Stop: 1305    Procedure: MINIMALLY INVASIVE LUMBAR DISCECTOMY, RIGHT APPROACH, LUMBAR 4-LUMBAR 5 FOR RECURRENT DISC HERNIATION (Right Spine Lumbar) Diagnosis:       Lumbar disc herniation      (Lumbar disc herniation [M51.26])    Surgeons: Joseph Smith MD Provider: Guilherme Bill MD    Anesthesia Type: general ASA Status: 2          Anesthesia Type: general    Vitals  Vitals Value Taken Time   /113 03/09/22 1310   Temp     Pulse 56 03/09/22 1315   Resp     SpO2 100 % 03/09/22 1315   Vitals shown include unvalidated device data.        Post Anesthesia Care and Evaluation    Patient location during evaluation: bedside  Patient participation: complete - patient participated  Level of consciousness: awake  Pain management: adequate  Airway patency: patent  Anesthetic complications: No anesthetic complications  PONV Status: none  Cardiovascular status: acceptable and stable  Respiratory status: acceptable  Hydration status: acceptable    Comments: An Anesthesiologist personally participated in the most demanding procedures (including induction and emergence if applicable) in the anesthesia plan, monitored the course of anesthesia administration at frequent intervals and remained physically present and available for immediate diagnosis and treatment of emergencies.

## 2022-03-09 NOTE — INTERVAL H&P NOTE
H&P reviewed.  The patient was examined and there are no changes to the H&P    Cardiovascular: no notable edema.  Pulmonary: non-labored breathing.     Risks and benefits discussed with patient. No new allergies reported.

## 2022-03-09 NOTE — OP NOTE
LUMBAR DISCECTOMY POSTERIOR WITH METRIX  Procedure Report    Patient Name:  Jenni Reid  YOB: 1990    Date of Surgery:  3/9/2022     Indications: Right lumbar 4-lumbar 5 recurrent disc herniation with radiculopathy    Pre-op Diagnosis:   Lumbar disc herniation [M51.26]       Post-Op Diagnosis Codes:     * Lumbar disc herniation [M51.26]    Procedure/CPT® Codes:      Procedure(s):  MINIMALLY INVASIVE LUMBAR DISCECTOMY, RIGHT APPROACH, LUMBAR 4-LUMBAR 5 FOR RECURRENT DISC HERNIATION    Staff:  Surgeon(s):  Joseph Smith MD    Assistant: Jenni Diaz; Rupa Astorga RN CSA    Anesthesia: General    Estimated Blood Loss: 30 mL      Specimen:          Disc (none to pathology)        Findings: Scar from previous surgery, fairly large L4-5 disc protrusion.    Complications: No intraoperative complications    Description of Procedure: After informed consent was obtained, the patient was brought to the operating room. After the induction of adequate general endotracheal anesthesia, they were place in the prone position on the Carlo frame. All pressure points were padded. The back was prepped and draped in the typical fashion. A timeout was performed. The midline was marked and a line roughly 1.5 cms off the midline to the right was drawn. The C-arm and a spinal needle were used to localize the L4-5 level. A 2 cm incision was then made and the Boss tubular retractor system was used to dilate the tissue docking at L4-5. The level was confirmed with fluoroscopy. The microscope was brought in and used for the remainder of the case for improved magnification and illumination. The remaining lamina was cleared to the site of the previous laminotomy.  The laminotomy was extended laterally and superiorly.  There was scar encasing the spinal sac and the L5 nerve root.  The disc space was identified lateral to the edge of the pedicle and entered using a small upgoing dissector.  The opening was extended  using a large upgoing dissector.  At this point the small up-biting pituitary was able to reach under the L5 nerve root to grab the disc protrusion.  Multiple fragments were removed after which point a ball probe passed freely under the L5 nerve root with no additional palpable fragments.  The ball probe was then passed distally along the L5 nerve root with no additional compression.  Hemostasis was obtained using a bipolar electrocautery as well as Gelfoam and thrombin.  The wound was irrigated with normal saline.  40 mg of Depo-Medrol was placed over the spinal sac and the L5 nerve root.  The tubular retractor was removed and hemostasis assured in the soft tissue. The fascia was reapproximated using a 0 Vicryl and the the skin edges were reapproximated using a subcutaneous 2-0 Vicryl. The wound was dressed with mastisol, steri-strips, Telfa and Tegaderm. All sponge and needle counts were correct. The patient received a dose of preoperative antibiotics.     Assistant: Jenni Diaz Kimberly L, RN CSA  was responsible for performing the following activities: Retraction, Suction, Closing and Placing Dressing and their skilled assistance was necessary for the success of this case.    Joseph Smith MD     Date: 3/9/2022  Time: 13:03 EST

## 2022-03-10 ENCOUNTER — PATIENT MESSAGE (OUTPATIENT)
Dept: NEUROSURGERY | Facility: CLINIC | Age: 32
End: 2022-03-10

## 2022-03-10 ENCOUNTER — DOCUMENTATION (OUTPATIENT)
Dept: FAMILY MEDICINE CLINIC | Facility: CLINIC | Age: 32
End: 2022-03-10

## 2022-03-10 RX ORDER — CYCLOBENZAPRINE HCL 10 MG
10 TABLET ORAL 3 TIMES DAILY PRN
Qty: 30 TABLET | Refills: 1 | OUTPATIENT
Start: 2022-03-10 | End: 2022-11-11

## 2022-03-10 RX ORDER — OMEPRAZOLE 40 MG/1
40 CAPSULE, DELAYED RELEASE ORAL DAILY
Qty: 30 CAPSULE | Refills: 2 | Status: SHIPPED | OUTPATIENT
Start: 2022-03-10 | End: 2022-05-05 | Stop reason: SDUPTHER

## 2022-03-14 DIAGNOSIS — M51.26 LUMBAR DISC HERNIATION: ICD-10-CM

## 2022-03-14 RX ORDER — OXYCODONE HYDROCHLORIDE AND ACETAMINOPHEN 5; 325 MG/1; MG/1
1 TABLET ORAL EVERY 6 HOURS PRN
Qty: 12 TABLET | Refills: 0 | Status: SHIPPED | OUTPATIENT
Start: 2022-03-14 | End: 2022-05-31

## 2022-03-17 ENCOUNTER — TELEPHONE (OUTPATIENT)
Dept: FAMILY MEDICINE CLINIC | Facility: CLINIC | Age: 32
End: 2022-03-17

## 2022-03-17 DIAGNOSIS — S39.92XA INJURY OF BACK, INITIAL ENCOUNTER: ICD-10-CM

## 2022-03-17 DIAGNOSIS — M54.41 ACUTE BILATERAL LOW BACK PAIN WITH BILATERAL SCIATICA: Primary | ICD-10-CM

## 2022-03-17 DIAGNOSIS — M54.42 ACUTE BILATERAL LOW BACK PAIN WITH BILATERAL SCIATICA: Primary | ICD-10-CM

## 2022-03-17 NOTE — PROGRESS NOTES
Called and spoke to patient, states that she was unlawfully infected from her house and was having to move boxes a few days after having back surgery.  Patient states that she felt a pop, and has had severe pain since that time.  Patient states that her sciatic pain was almost completely resolved, but then after this incident, she is having numbness and tingling that is radiating down both sides of her legs and into her toes.  Patient is concerned that she may have reinjured her back, would like an MRI stat, to further evaluate this.  Did try to attempt to call neurosurgery, but he is on vacation.  Will order stat MRI to further investigate this.

## 2022-03-17 NOTE — TELEPHONE ENCOUNTER
Patient called and wants to know if Thu can order an MRI of her back.  She recently had surgery for a herniated disc and was evicted last week.  She had to move and carry boxes and now she feels like it has caused her to have another herniated disc.  She needs the MRI by Tuesday according to her so she can results to her .  Please advise.

## 2022-03-22 ENCOUNTER — APPOINTMENT (OUTPATIENT)
Dept: MRI IMAGING | Facility: HOSPITAL | Age: 32
End: 2022-03-22

## 2022-03-28 ENCOUNTER — TELEPHONE (OUTPATIENT)
Dept: NEUROSURGERY | Facility: CLINIC | Age: 32
End: 2022-03-28

## 2022-03-28 ENCOUNTER — APPOINTMENT (OUTPATIENT)
Dept: CT IMAGING | Facility: HOSPITAL | Age: 32
End: 2022-03-28

## 2022-03-28 ENCOUNTER — HOSPITAL ENCOUNTER (EMERGENCY)
Facility: HOSPITAL | Age: 32
Discharge: HOME | End: 2022-03-28
Attending: EMERGENCY MEDICINE | Admitting: EMERGENCY MEDICINE

## 2022-03-28 VITALS
OXYGEN SATURATION: 99 % | BODY MASS INDEX: 50.82 KG/M2 | DIASTOLIC BLOOD PRESSURE: 59 MMHG | TEMPERATURE: 98.9 F | WEIGHT: 269.18 LBS | HEART RATE: 60 BPM | RESPIRATION RATE: 16 BRPM | HEIGHT: 61 IN | SYSTOLIC BLOOD PRESSURE: 121 MMHG

## 2022-03-28 DIAGNOSIS — R11.2 NAUSEA AND VOMITING, INTRACTABILITY OF VOMITING NOT SPECIFIED, UNSPECIFIED VOMITING TYPE: ICD-10-CM

## 2022-03-28 DIAGNOSIS — R10.12 LUQ ABDOMINAL PAIN: Primary | ICD-10-CM

## 2022-03-28 DIAGNOSIS — M51.26 LUMBAR DISC HERNIATION: ICD-10-CM

## 2022-03-28 LAB
ALBUMIN SERPL-MCNC: 4.4 G/DL (ref 3.5–5.2)
ALBUMIN/GLOB SERPL: 1.4 G/DL
ALP SERPL-CCNC: 67 U/L (ref 39–117)
ALT SERPL W P-5'-P-CCNC: 21 U/L (ref 1–33)
ANION GAP SERPL CALCULATED.3IONS-SCNC: 15.1 MMOL/L (ref 5–15)
AST SERPL-CCNC: 17 U/L (ref 1–32)
BASOPHILS # BLD AUTO: 0.03 10*3/MM3 (ref 0–0.2)
BASOPHILS NFR BLD AUTO: 0.3 % (ref 0–1.5)
BILIRUB SERPL-MCNC: 0.3 MG/DL (ref 0–1.2)
BUN SERPL-MCNC: 12 MG/DL (ref 6–20)
BUN/CREAT SERPL: 17.1 (ref 7–25)
CALCIUM SPEC-SCNC: 10 MG/DL (ref 8.6–10.5)
CHLORIDE SERPL-SCNC: 105 MMOL/L (ref 98–107)
CO2 SERPL-SCNC: 15.9 MMOL/L (ref 22–29)
CREAT SERPL-MCNC: 0.7 MG/DL (ref 0.57–1)
D-LACTATE SERPL-SCNC: 1.8 MMOL/L (ref 0.5–2)
DEPRECATED RDW RBC AUTO: 38.9 FL (ref 37–54)
EGFRCR SERPLBLD CKD-EPI 2021: 119.5 ML/MIN/1.73
EOSINOPHIL # BLD AUTO: 0.03 10*3/MM3 (ref 0–0.4)
EOSINOPHIL NFR BLD AUTO: 0.3 % (ref 0.3–6.2)
ERYTHROCYTE [DISTWIDTH] IN BLOOD BY AUTOMATED COUNT: 13.4 % (ref 12.3–15.4)
GLOBULIN UR ELPH-MCNC: 3.2 GM/DL
GLUCOSE SERPL-MCNC: 140 MG/DL (ref 65–99)
HCT VFR BLD AUTO: 41 % (ref 34–46.6)
HGB BLD-MCNC: 13.9 G/DL (ref 12–15.9)
HOLD SPECIMEN: NORMAL
IMM GRANULOCYTES # BLD AUTO: 0.1 10*3/MM3 (ref 0–0.05)
IMM GRANULOCYTES NFR BLD AUTO: 0.9 % (ref 0–0.5)
LIPASE SERPL-CCNC: 16 U/L (ref 13–60)
LYMPHOCYTES # BLD AUTO: 1.15 10*3/MM3 (ref 0.7–3.1)
LYMPHOCYTES NFR BLD AUTO: 10.5 % (ref 19.6–45.3)
MCH RBC QN AUTO: 27.4 PG (ref 26.6–33)
MCHC RBC AUTO-ENTMCNC: 33.9 G/DL (ref 31.5–35.7)
MCV RBC AUTO: 80.9 FL (ref 79–97)
MONOCYTES # BLD AUTO: 0.19 10*3/MM3 (ref 0.1–0.9)
MONOCYTES NFR BLD AUTO: 1.7 % (ref 5–12)
NEUTROPHILS NFR BLD AUTO: 86.3 % (ref 42.7–76)
NEUTROPHILS NFR BLD AUTO: 9.49 10*3/MM3 (ref 1.7–7)
NRBC BLD AUTO-RTO: 0 /100 WBC (ref 0–0.2)
PLATELET # BLD AUTO: 238 10*3/MM3 (ref 140–450)
PMV BLD AUTO: 12 FL (ref 6–12)
POTASSIUM SERPL-SCNC: 3.7 MMOL/L (ref 3.5–5.2)
PROT SERPL-MCNC: 7.6 G/DL (ref 6–8.5)
RBC # BLD AUTO: 5.07 10*6/MM3 (ref 3.77–5.28)
SODIUM SERPL-SCNC: 136 MMOL/L (ref 136–145)
WBC NRBC COR # BLD: 10.99 10*3/MM3 (ref 3.4–10.8)
WHOLE BLOOD HOLD SPECIMEN: NORMAL

## 2022-03-28 PROCEDURE — 99283 EMERGENCY DEPT VISIT LOW MDM: CPT

## 2022-03-28 PROCEDURE — 83605 ASSAY OF LACTIC ACID: CPT

## 2022-03-28 PROCEDURE — 25010000002 ONDANSETRON PER 1 MG

## 2022-03-28 PROCEDURE — 85025 COMPLETE CBC W/AUTO DIFF WBC: CPT | Performed by: EMERGENCY MEDICINE

## 2022-03-28 PROCEDURE — 74177 CT ABD & PELVIS W/CONTRAST: CPT

## 2022-03-28 PROCEDURE — 36415 COLL VENOUS BLD VENIPUNCTURE: CPT

## 2022-03-28 PROCEDURE — 83690 ASSAY OF LIPASE: CPT

## 2022-03-28 PROCEDURE — 25010000002 KETOROLAC TROMETHAMINE PER 15 MG

## 2022-03-28 PROCEDURE — 25010000002 MORPHINE PER 10 MG: Performed by: EMERGENCY MEDICINE

## 2022-03-28 PROCEDURE — 25010000002 PROCHLORPERAZINE 10 MG/2ML SOLUTION

## 2022-03-28 PROCEDURE — 96374 THER/PROPH/DIAG INJ IV PUSH: CPT

## 2022-03-28 PROCEDURE — 96375 TX/PRO/DX INJ NEW DRUG ADDON: CPT

## 2022-03-28 PROCEDURE — 93005 ELECTROCARDIOGRAM TRACING: CPT | Performed by: EMERGENCY MEDICINE

## 2022-03-28 PROCEDURE — 0 IOPAMIDOL PER 1 ML: Performed by: EMERGENCY MEDICINE

## 2022-03-28 PROCEDURE — 93010 ELECTROCARDIOGRAM REPORT: CPT | Performed by: INTERNAL MEDICINE

## 2022-03-28 PROCEDURE — 80053 COMPREHEN METABOLIC PANEL: CPT | Performed by: EMERGENCY MEDICINE

## 2022-03-28 RX ORDER — KETOROLAC TROMETHAMINE 30 MG/ML
30 INJECTION, SOLUTION INTRAMUSCULAR; INTRAVENOUS ONCE
Status: COMPLETED | OUTPATIENT
Start: 2022-03-28 | End: 2022-03-28

## 2022-03-28 RX ORDER — ONDANSETRON 2 MG/ML
4 INJECTION INTRAMUSCULAR; INTRAVENOUS ONCE
Status: COMPLETED | OUTPATIENT
Start: 2022-03-28 | End: 2022-03-28

## 2022-03-28 RX ORDER — PROCHLORPERAZINE MALEATE 5 MG/1
5 TABLET ORAL EVERY 6 HOURS PRN
Qty: 20 TABLET | Refills: 0 | Status: SHIPPED | OUTPATIENT
Start: 2022-03-28 | End: 2022-03-28 | Stop reason: SDUPTHER

## 2022-03-28 RX ORDER — PROCHLORPERAZINE EDISYLATE 5 MG/ML
5 INJECTION INTRAMUSCULAR; INTRAVENOUS ONCE
Status: COMPLETED | OUTPATIENT
Start: 2022-03-28 | End: 2022-03-28

## 2022-03-28 RX ORDER — DICYCLOMINE HCL 20 MG
20 TABLET ORAL EVERY 6 HOURS
Qty: 20 TABLET | Refills: 0 | Status: SHIPPED | OUTPATIENT
Start: 2022-03-28 | End: 2022-08-18

## 2022-03-28 RX ORDER — DICYCLOMINE HCL 20 MG
20 TABLET ORAL EVERY 6 HOURS
Qty: 20 TABLET | Refills: 0 | Status: SHIPPED | OUTPATIENT
Start: 2022-03-28 | End: 2022-03-28 | Stop reason: SDUPTHER

## 2022-03-28 RX ORDER — PROCHLORPERAZINE MALEATE 5 MG/1
5 TABLET ORAL EVERY 6 HOURS PRN
Qty: 20 TABLET | Refills: 0 | OUTPATIENT
Start: 2022-03-28 | End: 2022-03-30

## 2022-03-28 RX ADMIN — MORPHINE SULFATE 4 MG: 4 INJECTION INTRAVENOUS at 18:44

## 2022-03-28 RX ADMIN — ONDANSETRON 4 MG: 2 INJECTION INTRAMUSCULAR; INTRAVENOUS at 17:26

## 2022-03-28 RX ADMIN — PROCHLORPERAZINE EDISYLATE 5 MG: 5 INJECTION INTRAMUSCULAR; INTRAVENOUS at 18:34

## 2022-03-28 RX ADMIN — KETOROLAC TROMETHAMINE 30 MG: 30 INJECTION, SOLUTION INTRAMUSCULAR; INTRAVENOUS at 17:27

## 2022-03-28 RX ADMIN — IOPAMIDOL 100 ML: 755 INJECTION, SOLUTION INTRAVENOUS at 18:17

## 2022-03-28 NOTE — ED PROVIDER NOTES
Subjective   Patient is a 31-year-old female presenting today due to left upper quadrant abdominal pain that began this morning.  Patient rates the pain a 10 out of 10 and describes it as sharp with no radiation.  Tried taking Zofran but states that she ended up just vomiting it back up, no analgesics upon arrival to the ED. patient was taken to diclofenac for previous back surgery but her surgeon told her to quit taking that due to possible peptic ulcers.  Patient admits to nausea and vomiting.  Patient states that she has never had this pain in the past.  Patient denies previous abdominal surgeries.       History provided by:  Patient   used: No    Abdominal Pain  Pain location:  LUQ  Pain quality: sharp    Pain radiates to:  Does not radiate  Pain severity:  Moderate  Timing:  Constant  Progression:  Worsening  Relieved by:  None tried  Worsened by:  Movement  Ineffective treatments:  None tried  Associated symptoms: nausea and vomiting    Associated symptoms: no chills and no fever        Review of Systems   Constitutional: Negative.  Negative for chills and fever.   HENT: Negative.    Eyes: Negative.    Respiratory: Negative.    Cardiovascular: Negative.    Gastrointestinal: Positive for abdominal pain, nausea and vomiting.   Endocrine: Negative.    Genitourinary: Negative.    Musculoskeletal: Negative.    Skin: Negative.    Allergic/Immunologic: Negative.    Neurological: Negative.    Hematological: Negative.    Psychiatric/Behavioral: Negative.        Past Medical History:   Diagnosis Date   • Allergic    • Anxiety    • Depression    • Injury of back     LUMBAR 4/5   • PCOS (polycystic ovarian syndrome)    • PONV (postoperative nausea and vomiting)    • Sciatica of right side    • Seasonal allergies        Allergies   Allergen Reactions   • Nickel Rash     BLISTERS/REDNESS/RASH ON SKIN   • Shellfish Allergy Hives, Diarrhea, Itching, Nausea And Vomiting, Rash, Dizziness and Confusion        Past Surgical History:   Procedure Laterality Date   • ADENOIDECTOMY     • BACK SURGERY      DISCECTOMY X2 LUMBAR 5 S1   • LUMBAR DISCECTOMY Right 3/9/2022    Procedure: MINIMALLY INVASIVE LUMBAR DISCECTOMY, RIGHT APPROACH, LUMBAR 4-LUMBAR 5 FOR RECURRENT DISC HERNIATION;  Surgeon: Joseph Smith MD;  Location: [facility] MAIN OR;  Service: Neurosurgery;  Laterality: Right;   • TONSILLECTOMY         Family History   Problem Relation Age of Onset   • Malig Hyperthermia Neg Hx        Social History     Socioeconomic History   • Marital status: Significant Other   Tobacco Use   • Smoking status: Former Smoker     Packs/day: 1.00     Years: 10.00     Pack years: 10.00     Types: Cigarettes     Quit date: 1/1/2019     Years since quitting: 3.2   • Smokeless tobacco: Never Used   • Tobacco comment: Pt quit smoking 2019   Vaping Use   • Vaping Use: Every day   • Substances: Nicotine, INST PER ANESTHESIA PROTOCOL. Last 3/9/22 at 0800   • Devices: Disposable   Substance and Sexual Activity   • Alcohol use: Yes     Comment: Occasional   • Drug use: Never   • Sexual activity: Yes     Partners: Female           Objective   Physical Exam  Vitals and nursing note reviewed.   Constitutional:       General: She is in acute distress.      Appearance: Normal appearance. She is well-developed.   HENT:      Head: Normocephalic and atraumatic.      Nose: Nose normal.      Mouth/Throat:      Mouth: Mucous membranes are moist.   Eyes:      Extraocular Movements: Extraocular movements intact.      Conjunctiva/sclera: Conjunctivae normal.      Pupils: Pupils are equal, round, and reactive to light.   Cardiovascular:      Rate and Rhythm: Normal rate and regular rhythm.      Heart sounds: Normal heart sounds.   Pulmonary:      Effort: Pulmonary effort is normal.      Breath sounds: Normal breath sounds.   Abdominal:      General: Abdomen is flat. Bowel sounds are decreased.      Palpations: Abdomen is soft.      Tenderness: There is  abdominal tenderness in the left lower quadrant.   Musculoskeletal:         General: Normal range of motion.      Cervical back: Normal range of motion and neck supple.   Skin:     General: Skin is warm and dry.   Neurological:      General: No focal deficit present.      Mental Status: She is alert and oriented to person, place, and time.   Psychiatric:         Mood and Affect: Mood normal.         Behavior: Behavior normal.         Procedures           ED Course                                                 MDM  Number of Diagnoses or Management Options  Diagnosis management comments: .I have spoken with patient. I have explained the patient´s condition, diagnoses and treatment plan based on the information available to me at this time. I have answered the patient's questions and addressed any concerns. The patient has a good  understanding of the patient´s diagnosis, condition, and treatment plan as can be expected at this point. The vital signs have been stable. The patient´s condition is stable and appropriate for discharge from the emergency department.      The patient will pursue further outpatient evaluation with the primary care physician or other designated or consulting physician as outlined in the discharge instructions. They are agreeable to this plan of care and follow-up instructions have been explained in detail. The patient has received these instructions in written format and have expressed an understanding of the discharge instructions. The patient is aware that any significant change in condition or worsening of symptoms should prompt an immediate return to this or the closest emergency department or call to 911.         Amount and/or Complexity of Data Reviewed  Clinical lab tests: reviewed  Tests in the radiology section of CPT®: reviewed and ordered  Tests in the medicine section of CPT®: reviewed    Risk of Complications, Morbidity, and/or Mortality  Presenting problems: low  Diagnostic  procedures: low  Management options: low    Patient Progress  Patient progress: stable      Final diagnoses:   LUQ abdominal pain   Nausea and vomiting, intractability of vomiting not specified, unspecified vomiting type       ED Disposition  ED Disposition     ED Disposition   Discharge    Condition   Stable    Comment   --             Thu Wallis, APRN  [address] [address]  [phone]    Schedule an appointment as soon as possible for a visit   If symptoms worsen         Medication List      New Prescriptions    dicyclomine 20 MG tablet  Commonly known as: BENTYL  Take 1 tablet by mouth Every 6 (Six) Hours.     prochlorperazine 5 MG tablet  Commonly known as: COMPAZINE  Take 1 tablet by mouth Every 6 (Six) Hours As Needed for Nausea or Vomiting.           Where to Get Your Medications      [place] [id number] [facility] AT [facility] [address] [address] - [phone]  - [phone] [place] [id number]    Phone: [phone]   · dicyclomine 20 MG tablet  · prochlorperazine 5 MG tablet          Tushar Crowe PA-C  03/28/22 2013

## 2022-03-29 NOTE — DISCHARGE INSTRUCTIONS
Take Compazine for nausea vomiting as needed  Take Bentyl as needed for stomach pains  Please drink plenty of fluids and eat small amounts of bland food and increments  please follow-up with your primary care as needed

## 2022-03-30 ENCOUNTER — TELEPHONE (OUTPATIENT)
Dept: NEUROSURGERY | Facility: CLINIC | Age: 32
End: 2022-03-30

## 2022-03-30 ENCOUNTER — HOSPITAL ENCOUNTER (EMERGENCY)
Facility: HOSPITAL | Age: 32
Discharge: HOME | End: 2022-03-30
Attending: EMERGENCY MEDICINE | Admitting: EMERGENCY MEDICINE

## 2022-03-30 ENCOUNTER — TELEMEDICINE (OUTPATIENT)
Dept: FAMILY MEDICINE CLINIC | Facility: CLINIC | Age: 32
End: 2022-03-30

## 2022-03-30 VITALS
HEART RATE: 57 BPM | SYSTOLIC BLOOD PRESSURE: 121 MMHG | OXYGEN SATURATION: 95 % | RESPIRATION RATE: 22 BRPM | DIASTOLIC BLOOD PRESSURE: 68 MMHG | BODY MASS INDEX: 45.81 KG/M2 | HEIGHT: 64 IN | TEMPERATURE: 98.3 F | WEIGHT: 268.3 LBS

## 2022-03-30 DIAGNOSIS — R11.2 INTRACTABLE VOMITING WITH NAUSEA, UNSPECIFIED VOMITING TYPE: Primary | ICD-10-CM

## 2022-03-30 DIAGNOSIS — R10.12 LEFT UPPER QUADRANT ABDOMINAL PAIN OF UNKNOWN ETIOLOGY: ICD-10-CM

## 2022-03-30 DIAGNOSIS — B34.9 VIRAL ILLNESS: ICD-10-CM

## 2022-03-30 DIAGNOSIS — E87.6 HYPOKALEMIA: ICD-10-CM

## 2022-03-30 DIAGNOSIS — R11.12 PROJECTILE VOMITING WITH NAUSEA: Primary | ICD-10-CM

## 2022-03-30 LAB
ALBUMIN SERPL-MCNC: 4.2 G/DL (ref 3.5–5.2)
ALBUMIN/GLOB SERPL: 1.4 G/DL
ALP SERPL-CCNC: 62 U/L (ref 39–117)
ALT SERPL W P-5'-P-CCNC: 21 U/L (ref 1–33)
ANION GAP SERPL CALCULATED.3IONS-SCNC: 14.8 MMOL/L (ref 5–15)
AST SERPL-CCNC: 19 U/L (ref 1–32)
BASOPHILS # BLD AUTO: 0.03 10*3/MM3 (ref 0–0.2)
BASOPHILS NFR BLD AUTO: 0.4 % (ref 0–1.5)
BILIRUB SERPL-MCNC: 0.6 MG/DL (ref 0–1.2)
BUN SERPL-MCNC: 19 MG/DL (ref 6–20)
BUN/CREAT SERPL: 21.6 (ref 7–25)
CALCIUM SPEC-SCNC: 9.5 MG/DL (ref 8.6–10.5)
CHLORIDE SERPL-SCNC: 106 MMOL/L (ref 98–107)
CO2 SERPL-SCNC: 19.2 MMOL/L (ref 22–29)
CREAT SERPL-MCNC: 0.88 MG/DL (ref 0.57–1)
DEPRECATED RDW RBC AUTO: 41.8 FL (ref 37–54)
EGFRCR SERPLBLD CKD-EPI 2021: 90.8 ML/MIN/1.73
EOSINOPHIL # BLD AUTO: 0.05 10*3/MM3 (ref 0–0.4)
EOSINOPHIL NFR BLD AUTO: 0.6 % (ref 0.3–6.2)
ERYTHROCYTE [DISTWIDTH] IN BLOOD BY AUTOMATED COUNT: 13.9 % (ref 12.3–15.4)
GLOBULIN UR ELPH-MCNC: 2.9 GM/DL
GLUCOSE SERPL-MCNC: 140 MG/DL (ref 65–99)
HCG INTACT+B SERPL-ACNC: <0.5 MIU/ML
HCT VFR BLD AUTO: 40.3 % (ref 34–46.6)
HGB BLD-MCNC: 13.6 G/DL (ref 12–15.9)
HOLD SPECIMEN: NORMAL
HOLD SPECIMEN: NORMAL
IMM GRANULOCYTES # BLD AUTO: 0.05 10*3/MM3 (ref 0–0.05)
IMM GRANULOCYTES NFR BLD AUTO: 0.6 % (ref 0–0.5)
LIPASE SERPL-CCNC: 15 U/L (ref 13–60)
LYMPHOCYTES # BLD AUTO: 1.32 10*3/MM3 (ref 0.7–3.1)
LYMPHOCYTES NFR BLD AUTO: 16.9 % (ref 19.6–45.3)
MCH RBC QN AUTO: 28 PG (ref 26.6–33)
MCHC RBC AUTO-ENTMCNC: 33.7 G/DL (ref 31.5–35.7)
MCV RBC AUTO: 83.1 FL (ref 79–97)
MONOCYTES # BLD AUTO: 0.26 10*3/MM3 (ref 0.1–0.9)
MONOCYTES NFR BLD AUTO: 3.3 % (ref 5–12)
NEUTROPHILS NFR BLD AUTO: 6.11 10*3/MM3 (ref 1.7–7)
NEUTROPHILS NFR BLD AUTO: 78.2 % (ref 42.7–76)
NRBC BLD AUTO-RTO: 0 /100 WBC (ref 0–0.2)
PLATELET # BLD AUTO: 198 10*3/MM3 (ref 140–450)
PMV BLD AUTO: 11.8 FL (ref 6–12)
POTASSIUM SERPL-SCNC: 3.3 MMOL/L (ref 3.5–5.2)
PROT SERPL-MCNC: 7.1 G/DL (ref 6–8.5)
RBC # BLD AUTO: 4.85 10*6/MM3 (ref 3.77–5.28)
SODIUM SERPL-SCNC: 140 MMOL/L (ref 136–145)
WBC NRBC COR # BLD: 7.82 10*3/MM3 (ref 3.4–10.8)
WHOLE BLOOD HOLD SPECIMEN: NORMAL
WHOLE BLOOD HOLD SPECIMEN: NORMAL

## 2022-03-30 PROCEDURE — 36415 COLL VENOUS BLD VENIPUNCTURE: CPT

## 2022-03-30 PROCEDURE — 80053 COMPREHEN METABOLIC PANEL: CPT | Performed by: EMERGENCY MEDICINE

## 2022-03-30 PROCEDURE — 83690 ASSAY OF LIPASE: CPT | Performed by: EMERGENCY MEDICINE

## 2022-03-30 PROCEDURE — 25010000002 HYDROMORPHONE 1 MG/ML SOLUTION: Performed by: EMERGENCY MEDICINE

## 2022-03-30 PROCEDURE — 96375 TX/PRO/DX INJ NEW DRUG ADDON: CPT

## 2022-03-30 PROCEDURE — 96374 THER/PROPH/DIAG INJ IV PUSH: CPT

## 2022-03-30 PROCEDURE — 99213 OFFICE O/P EST LOW 20 MIN: CPT

## 2022-03-30 PROCEDURE — 85025 COMPLETE CBC W/AUTO DIFF WBC: CPT | Performed by: EMERGENCY MEDICINE

## 2022-03-30 PROCEDURE — 96376 TX/PRO/DX INJ SAME DRUG ADON: CPT

## 2022-03-30 PROCEDURE — 25010000002 ONDANSETRON PER 1 MG: Performed by: EMERGENCY MEDICINE

## 2022-03-30 PROCEDURE — 25010000002 KETOROLAC TROMETHAMINE PER 15 MG: Performed by: EMERGENCY MEDICINE

## 2022-03-30 PROCEDURE — 84702 CHORIONIC GONADOTROPIN TEST: CPT | Performed by: EMERGENCY MEDICINE

## 2022-03-30 PROCEDURE — 99284 EMERGENCY DEPT VISIT MOD MDM: CPT

## 2022-03-30 RX ORDER — SODIUM CHLORIDE 0.9 % (FLUSH) 0.9 %
10 SYRINGE (ML) INJECTION AS NEEDED
Status: DISCONTINUED | OUTPATIENT
Start: 2022-03-30 | End: 2022-03-30 | Stop reason: HOSPADM

## 2022-03-30 RX ORDER — ONDANSETRON 2 MG/ML
4 INJECTION INTRAMUSCULAR; INTRAVENOUS ONCE
Status: COMPLETED | OUTPATIENT
Start: 2022-03-30 | End: 2022-03-30

## 2022-03-30 RX ORDER — PROMETHAZINE HYDROCHLORIDE 12.5 MG/1
12.5 SUPPOSITORY RECTAL EVERY 6 HOURS PRN
Qty: 12 EACH | Refills: 0 | OUTPATIENT
Start: 2022-03-30 | End: 2022-11-11

## 2022-03-30 RX ORDER — KETOROLAC TROMETHAMINE 30 MG/ML
30 INJECTION, SOLUTION INTRAMUSCULAR; INTRAVENOUS ONCE
Status: COMPLETED | OUTPATIENT
Start: 2022-03-30 | End: 2022-03-30

## 2022-03-30 RX ORDER — POTASSIUM CHLORIDE 750 MG/1
40 CAPSULE, EXTENDED RELEASE ORAL ONCE
Status: COMPLETED | OUTPATIENT
Start: 2022-03-30 | End: 2022-03-30

## 2022-03-30 RX ORDER — ONDANSETRON 8 MG/1
8 TABLET, ORALLY DISINTEGRATING ORAL 4 TIMES DAILY PRN
Qty: 20 TABLET | Refills: 0 | OUTPATIENT
Start: 2022-03-30 | End: 2022-11-11

## 2022-03-30 RX ADMIN — ONDANSETRON 4 MG: 2 INJECTION INTRAMUSCULAR; INTRAVENOUS at 13:59

## 2022-03-30 RX ADMIN — POTASSIUM CHLORIDE 40 MEQ: 750 CAPSULE, EXTENDED RELEASE ORAL at 17:49

## 2022-03-30 RX ADMIN — ONDANSETRON 4 MG: 2 INJECTION INTRAMUSCULAR; INTRAVENOUS at 14:01

## 2022-03-30 RX ADMIN — HYDROMORPHONE HYDROCHLORIDE 1 MG: 1 INJECTION, SOLUTION INTRAMUSCULAR; INTRAVENOUS; SUBCUTANEOUS at 16:33

## 2022-03-30 RX ADMIN — SODIUM CHLORIDE 2000 ML: 9 INJECTION, SOLUTION INTRAVENOUS at 14:00

## 2022-03-30 RX ADMIN — KETOROLAC TROMETHAMINE 30 MG: 30 INJECTION, SOLUTION INTRAMUSCULAR; INTRAVENOUS at 14:01

## 2022-03-30 NOTE — TELEPHONE ENCOUNTER
Caller: PEDRO WESTFALL    Relationship to patient: Emergency Contact    Best call back number: [phone]    Patient is needing:PT CALLED TO CANCEL HER APPT. FOR TODAY  APPT. NOTES STATE TO RESCHEDULE-TRIED TO WARM TRANSFER BUT THE PHONE RANG AND RANG-PLEASE CONTACT PT TO RESCHEDULE THANK YOU!

## 2022-03-30 NOTE — PROGRESS NOTES
Jenni Reid presents to [facility] with complaints of viral illness, persistent nausea and vomiting.    You have chosen to receive care through a telephone visit. Do you consent to use a telephone visit for your medical care today? YES    Patient was in home with significant other, I was present in patient room.    This was an audio and video enabled telemedicine encounter.     History of Present Illness  This is a 31-year-old female who presents to the clinic for her 1 month follow-up, but with complaints of viral illness, persistent nausea and vomiting.    Unable to do the 1 month follow-up as patient is profusely vomiting the entire visit via telehealth, so basically we just discussed the viral illness/nausea/vomiting.    Patient states that she has been sick since earlier this week with this pretty severe viral illness.  Patient states that she is got up with some pretty severe abdominal pain with associated nausea and vomiting.  Patient cannot keep anything down, states that she has been to the ER on the 28th, they did give her some IV nausea medicine as well as sent her home with some.  Patient states that since that time, she is not improved.  Patient cannot move without having to vomit, and the abdominal pain is pretty severe.  Patient has not had any diarrhea or other symptoms.    The following portions of the patient's history were personally reviewed and updated as appropriate: allergies, current medications, past medical history, past surgical history, past family history, and past social history.       Objective     All labs, imaging, test results, and specialty provider notes reviewed with patient.     Physical Exam  Vitals reviewed.   Constitutional:       General: She is in acute distress.      Appearance: She is ill-appearing and diaphoretic.   Pulmonary:      Effort: Pulmonary effort is normal.   Skin:     Coloration: Skin is pale.   Neurological:      General:  No focal deficit present.      Mental Status: She is oriented to person, place, and time.            Assessment and Plan:  Diagnoses and all orders for this visit:    1. Projectile vomiting with nausea (Primary)  -     promethazine (PHENERGAN) 12.5 MG suppository; Insert 1 suppository into the rectum Every 6 (Six) Hours As Needed for Nausea or Vomiting.  Dispense: 12 each; Refill: 0    2. Viral illness  -     promethazine (PHENERGAN) 12.5 MG suppository; Insert 1 suppository into the rectum Every 6 (Six) Hours As Needed for Nausea or Vomiting.  Dispense: 12 each; Refill: 0    Exam was limited due to this being a telehealth visit, but it appears patient looked pretty acutely ill.  Due to the fact that she had was unable to keep any liquids down, did urge patient that she may need to go to the ER for some IV fluids due to risk of dehydration.  We will also send patient in some suppositories of Phenergan to see if this will help control her nausea and vomiting for when she does return from the ER.  Discussed that she does need to make sure that she is trying to drink as much fluids as possible, specifically those with electrolytes, but do recommend for patient to go to the ER.    Follow Up:  No follow-ups on file.    Patient was given instructions and counseling regarding her condition or for health maintenance advice. Please see specific information pulled into the AVS if appropriate.     Total Time Spent: 15 minutes

## 2022-04-12 DIAGNOSIS — F41.9 ANXIETY: ICD-10-CM

## 2022-04-12 DIAGNOSIS — F33.1 MODERATE EPISODE OF RECURRENT MAJOR DEPRESSIVE DISORDER: ICD-10-CM

## 2022-04-25 LAB — QT INTERVAL: 443 MS

## 2022-05-05 RX ORDER — OMEPRAZOLE 40 MG/1
40 CAPSULE, DELAYED RELEASE ORAL DAILY
Qty: 90 CAPSULE | Refills: 1 | Status: SHIPPED | OUTPATIENT
Start: 2022-05-05 | End: 2022-08-18

## 2022-05-05 RX ORDER — SERTRALINE HYDROCHLORIDE 100 MG/1
100 TABLET, FILM COATED ORAL DAILY
Qty: 90 TABLET | Refills: 1 | Status: SHIPPED | OUTPATIENT
Start: 2022-05-05 | End: 2022-09-01 | Stop reason: SDUPTHER

## 2022-05-10 ENCOUNTER — TELEMEDICINE (OUTPATIENT)
Dept: FAMILY MEDICINE CLINIC | Facility: CLINIC | Age: 32
End: 2022-05-10

## 2022-05-10 DIAGNOSIS — L73.1 INGROWN HAIR: Primary | ICD-10-CM

## 2022-05-10 PROCEDURE — 99213 OFFICE O/P EST LOW 20 MIN: CPT

## 2022-05-10 RX ORDER — TRIAMCINOLONE ACETONIDE 0.25 MG/G
1 OINTMENT TOPICAL 2 TIMES DAILY
Qty: 15 G | Refills: 0 | OUTPATIENT
Start: 2022-05-10 | End: 2022-11-11

## 2022-05-10 RX ORDER — CLINDAMYCIN AND BENZOYL PEROXIDE 10; 50 MG/G; MG/G
1 GEL TOPICAL 2 TIMES DAILY
Qty: 35 G | Refills: 0 | Status: SHIPPED | OUTPATIENT
Start: 2022-05-10

## 2022-05-10 NOTE — PROGRESS NOTES
Jenni Reid presents to [facility] with complaints of concern for infection near incision line of lower back.    You have chosen to receive care through a telephone visit. Do you consent to use a telephone visit for your medical care today? YES    This was an audio and video enabled telemedicine encounter.    Patient was present with her significant other in her bedroom, I was present in patient room.    History of Present Illness  This is a 31-year-old female who presents to the clinic with concern of infection near incision line of lower back.    Of note, patient had pretty recent lower back surgery, had an incision of that lower back area, states that yesterday she noted that there was a bump near the side of her incision, states it almost looked like a herpes lesion as she does have a history of this, and even had some clear drainage come out of it when accidentally punctured.  Patient states it has grown in size since then, looks red and irritated, and was just concerned that she could be developing an infection in that area and wanted to have it further evaluated.  Patient denies any fever/chills/body aches, denies any other symptoms.  Patient has not tried any thing over-the-counter, as she wanted to have this further evaluated first to make sure what she was doing was correct.    The following portions of the patient's history were personally reviewed and updated as appropriate: allergies, current medications, past medical history, past surgical history, past family history, and past social history.       Objective     All labs, imaging, test results, and specialty provider notes reviewed with patient.     Physical Exam  Constitutional:       Appearance: Normal appearance.   Skin:     Findings: Abscess present.          Neurological:      General: No focal deficit present.      Mental Status: She is alert and oriented to person, place, and time.        Physical exam  limited due to access, as this was a telehealth visit, could not properly assess area of concern.  Only could visually assess.    Assessment and Plan:  Diagnoses and all orders for this visit:    1. Ingrown hair (Primary)  -     triamcinolone (KENALOG) 0.025 % ointment; Apply 1 application topically to the appropriate area as directed 2 (Two) Times a Day.  Dispense: 15 g; Refill: 0  -     clindamycin-benzoyl peroxide (BenzaClin) 1-5 % gel; Apply 1 application topically to the appropriate area as directed 2 (Two) Times a Day.  Dispense: 35 g; Refill: 0      It appears that we have a ingrown hair that is developing near the incision line, does look like it starting to develop some fluid-filled, we will go ahead and treat with a topical antibiotic and some topical steroids, did discuss with patient to apply this as directed.  If symptoms persist or worsen, for her to let us know.  If she develops fever/chills/body aches, or has enlarging of this area, to let us know at that time.    Follow Up:  No follow-ups on file.    Patient was given instructions and counseling regarding her condition or for health maintenance advice. Please see specific information pulled into the AVS if appropriate.     Total Time Spent: 15 minutes

## 2022-05-31 ENCOUNTER — OFFICE VISIT (OUTPATIENT)
Dept: FAMILY MEDICINE CLINIC | Facility: CLINIC | Age: 32
End: 2022-05-31

## 2022-05-31 VITALS
OXYGEN SATURATION: 98 % | BODY MASS INDEX: 45.82 KG/M2 | WEIGHT: 268.4 LBS | HEART RATE: 84 BPM | TEMPERATURE: 98 F | SYSTOLIC BLOOD PRESSURE: 110 MMHG | HEIGHT: 64 IN | DIASTOLIC BLOOD PRESSURE: 82 MMHG | RESPIRATION RATE: 19 BRPM

## 2022-05-31 DIAGNOSIS — Z98.890 S/P LUMBAR DISCECTOMY: ICD-10-CM

## 2022-05-31 DIAGNOSIS — G89.29 CHRONIC BILATERAL LOW BACK PAIN WITH RIGHT-SIDED SCIATICA: ICD-10-CM

## 2022-05-31 DIAGNOSIS — K21.00 GASTROESOPHAGEAL REFLUX DISEASE WITH ESOPHAGITIS WITHOUT HEMORRHAGE: Primary | ICD-10-CM

## 2022-05-31 DIAGNOSIS — Z31.69 PRE-CONCEPTION COUNSELING: ICD-10-CM

## 2022-05-31 DIAGNOSIS — R63.2 POLYPHAGIA: ICD-10-CM

## 2022-05-31 DIAGNOSIS — M54.41 CHRONIC BILATERAL LOW BACK PAIN WITH RIGHT-SIDED SCIATICA: ICD-10-CM

## 2022-05-31 PROCEDURE — 99213 OFFICE O/P EST LOW 20 MIN: CPT

## 2022-05-31 NOTE — PROGRESS NOTES
"Jenni Reid presents to [facility] with complaints of nausea, vomiting, hunger, and concern of possible diabetes.      History of Present Illness  This is a 31-year-old female who presents to clinic with complaints of nausea, vomiting, hunger, and concern of possible diabetes.  Patient also would like a referral to pain management.    Patient states over the past few weeks has been experiencing nausea, vomiting, and diarrhea when she awakens.  Patient states that she went through an illness for about a week, had viral illness, and ever since then she has been having issues.  Patient states that she will wake up at around 6 AM, and immediately have to go vomit.  She also was nauseous at that time.  Patient denies any abdominal pain.  Patient states that she also takes omeprazole at night, but is also been taking Aleve every night due to the lower back pain that she is been experiencing.  Patient also was drinking a lot of soda, eating a lot of sweets, and since then has cut those out.  Patient states that since she is cut out those foods, she is not had any other issues.  She has continued to take the Aleve, due to the lower back pain.  She would like a referral to pain management, to see if she can get any benefit from them.      Patient also states that she would like to get pregnant, would like a referral to a specialist to have this happen.  Her and her girlfriend would like to be sent to a fertility specialist.    The following portions of the patient's history were personally reviewed and updated as appropriate: allergies, current medications, past medical history, past surgical history, past family history, and past social history.       Objective   Vital Signs:   /82   Pulse 82   Temp 98 °F (36.7 °C)   Resp 19   Ht 162.6 cm (64\")   SpO2 98%   BMI 46.05 kg/m²     Body mass index is 46.05 kg/m².    All labs, imaging, test results, and specialty provider notes " reviewed with patient.     Physical Exam  Vitals reviewed.   Constitutional:       Appearance: Normal appearance.   Cardiovascular:      Rate and Rhythm: Normal rate and regular rhythm.      Pulses: Normal pulses.      Heart sounds: Normal heart sounds.   Pulmonary:      Effort: Pulmonary effort is normal.      Breath sounds: Normal breath sounds.   Neurological:      General: No focal deficit present.      Mental Status: She is alert and oriented to person, place, and time.          Assessment and Plan:  Diagnoses and all orders for this visit:    1. Gastroesophageal reflux disease with esophagitis without hemorrhage (Primary)  Comments:  Continue omeprazole.  Stop taking Aleve.  Avoid sodas/carbs.  Send to GI for possible EGD.  Orders:  -     Ambulatory Referral to Gastroenterology    2. Polyphagia    3. S/P lumbar discectomy  -     Ambulatory Referral to Pain Management    4. Chronic bilateral low back pain with right-sided sciatica  Comments:  We will send to pain management, discussed to stop taking Aleve.  May take Tylenol.  Orders:  -     Ambulatory Referral to Pain Management    5. Pre-conception counseling  Comments:  Send to GYN for further counseling.  Orders:  -     Ambulatory Referral to Obstetrics / Gynecology    Other orders  -     Cancel: Ambulatory Referral to Obstetrics / Gynecology          Follow Up:  No follow-ups on file.    Patient was given instructions and counseling regarding her condition or for health maintenance advice. Please see specific information pulled into the AVS if appropriate.

## 2022-06-21 DIAGNOSIS — R11.12 PROJECTILE VOMITING WITH NAUSEA: Primary | ICD-10-CM

## 2022-06-21 DIAGNOSIS — R10.13 EPIGASTRIC PAIN: ICD-10-CM

## 2022-08-15 ENCOUNTER — CLINICAL SUPPORT (OUTPATIENT)
Dept: GASTROENTEROLOGY | Facility: CLINIC | Age: 32
End: 2022-08-15

## 2022-08-18 ENCOUNTER — PREP FOR SURGERY (OUTPATIENT)
Dept: OTHER | Facility: HOSPITAL | Age: 32
End: 2022-08-18

## 2022-08-18 ENCOUNTER — TELEPHONE (OUTPATIENT)
Dept: GASTROENTEROLOGY | Facility: CLINIC | Age: 32
End: 2022-08-18

## 2022-08-18 ENCOUNTER — OFFICE VISIT (OUTPATIENT)
Dept: GASTROENTEROLOGY | Facility: CLINIC | Age: 32
End: 2022-08-18

## 2022-08-18 VITALS
OXYGEN SATURATION: 99 % | WEIGHT: 264.8 LBS | HEART RATE: 66 BPM | HEIGHT: 64 IN | SYSTOLIC BLOOD PRESSURE: 149 MMHG | BODY MASS INDEX: 45.21 KG/M2 | DIASTOLIC BLOOD PRESSURE: 89 MMHG

## 2022-08-18 DIAGNOSIS — R19.4 CHANGE IN BOWEL HABITS: ICD-10-CM

## 2022-08-18 DIAGNOSIS — K21.9 GASTROESOPHAGEAL REFLUX DISEASE, UNSPECIFIED WHETHER ESOPHAGITIS PRESENT: Primary | ICD-10-CM

## 2022-08-18 DIAGNOSIS — Z79.1 NSAID LONG-TERM USE: ICD-10-CM

## 2022-08-18 DIAGNOSIS — R15.2 FECAL URGENCY: ICD-10-CM

## 2022-08-18 DIAGNOSIS — R19.7 DIARRHEA, UNSPECIFIED TYPE: ICD-10-CM

## 2022-08-18 DIAGNOSIS — R11.2 NAUSEA AND VOMITING, UNSPECIFIED VOMITING TYPE: ICD-10-CM

## 2022-08-18 PROCEDURE — 99214 OFFICE O/P EST MOD 30 MIN: CPT

## 2022-08-18 RX ORDER — PANTOPRAZOLE SODIUM 40 MG/1
40 TABLET, DELAYED RELEASE ORAL DAILY
Qty: 90 TABLET | Refills: 1 | Status: SHIPPED | OUTPATIENT
Start: 2022-08-18 | End: 2022-11-16

## 2022-08-18 RX ORDER — POLYETHYLENE GLYCOL 3350, SODIUM SULFATE ANHYDROUS, SODIUM BICARBONATE, SODIUM CHLORIDE, POTASSIUM CHLORIDE 227.1; 21.5; 6.36; 5.53; .754 G/L; G/L; G/L; G/L; G/L
4000 POWDER, FOR SOLUTION ORAL ONCE
Qty: 1 EACH | Refills: 0 | Status: SHIPPED | OUTPATIENT
Start: 2022-08-18 | End: 2022-08-18

## 2022-08-18 RX ORDER — SUCRALFATE 1 G/1
1 TABLET ORAL 4 TIMES DAILY
Qty: 120 TABLET | Refills: 0 | Status: SHIPPED | OUTPATIENT
Start: 2022-08-18 | End: 2022-09-17

## 2022-08-18 NOTE — PROGRESS NOTES
Chief Complaint  Nausea (X 3 months ), Vomiting (- States vomiting acid every morning ), and Heartburn    Jenni Reid is a 31 y.o. female who presents to [facility]- [facility] on referral from No ref. provider found for a gastroenterology evaluation of GERD, nausea, and vomiting.      History of Present Illness  New patient presents to the office for GERD, nausea, and vomiting. These symptoms started about 3 months ago, she has changed her diet but has not seen any improvement. She has eliminated all sodas, sweets, and spicy food. Every morning she has epigastric pain and vomits stomach acid. She was previously taking diclofenac prior to symptoms starting. She still takes Aleve weekly for hip pain. She was put on omeprazole 40 mg but stopped because it didn't help. She started having diarrhea in December of 2021. She has about 3 episodes a day. She has a lot of fecal urgency. Denies incontinence, nocturnal episodes, hematochezia, and melena.  Patient is scheduled for EGD with Dr. Muniz on 11/15/22    CT Abdomen and Pelvis w/ contrast 3/28/22 - no acute findings      Past Medical History:   Diagnosis Date   • Allergic    • Anxiety    • Depression    • Injury of back     LUMBAR 4/5   • PCOS (polycystic ovarian syndrome)    • PONV (postoperative nausea and vomiting)    • Sciatica of right side    • Seasonal allergies        Past Surgical History:   Procedure Laterality Date   • ADENOIDECTOMY     • BACK SURGERY      DISCECTOMY X2 LUMBAR 5 S1   • LUMBAR DISCECTOMY Right 3/9/2022    Procedure: MINIMALLY INVASIVE LUMBAR DISCECTOMY, RIGHT APPROACH, LUMBAR 4-LUMBAR 5 FOR RECURRENT DISC HERNIATION;  Surgeon: Joseph Smith MD;  Location: [facility] MAIN OR;  Service: Neurosurgery;  Laterality: Right;   • TONSILLECTOMY           Current Outpatient Medications:   •  albuterol sulfate  (90 Base) MCG/ACT inhaler, Inhale 2 puffs Every 6 (Six) Hours As Needed for Wheezing., Disp: 18 g, Rfl:  0  •  sertraline (Zoloft) 100 MG tablet, Take 1 tablet by mouth Daily., Disp: 90 tablet, Rfl: 1  •  clindamycin-benzoyl peroxide (BenzaClin) 1-5 % gel, Apply 1 application topically to the appropriate area as directed 2 (Two) Times a Day., Disp: 35 g, Rfl: 0  •  cyclobenzaprine (FLEXERIL) 10 MG tablet, Take 1 tablet by mouth 3 (Three) Times a Day As Needed for Muscle Spasms., Disp: 30 tablet, Rfl: 1  •  hydrOXYzine (ATARAX) 25 MG tablet, Take 1 tablet by mouth 3 (Three) Times a Day As Needed for Anxiety., Disp: 90 tablet, Rfl: 2  •  ondansetron ODT (ZOFRAN-ODT) 8 MG disintegrating tablet, Place 1 tablet on the tongue 4 (Four) Times a Day As Needed for Nausea or Vomiting., Disp: 20 tablet, Rfl: 0  •  pantoprazole (Protonix) 40 MG EC tablet, Take 1 tablet by mouth Daily for 90 days., Disp: 90 tablet, Rfl: 1  •  PEG 3350-KCl-NaBcb-NaCl-NaSulf (Golytely) 227.1 g pack, Take 4,000 mL by mouth 1 (One) Time for 1 dose. Take as directed by the office for colon prep, Disp: 1 each, Rfl: 0  •  promethazine (PHENERGAN) 12.5 MG suppository, Insert 1 suppository into the rectum Every 6 (Six) Hours As Needed for Nausea or Vomiting., Disp: 12 each, Rfl: 0  •  sucralfate (Carafate) 1 g tablet, Take 1 tablet by mouth 4 (Four) Times a Day for 30 days., Disp: 120 tablet, Rfl: 0  •  triamcinolone (KENALOG) 0.025 % ointment, Apply 1 application topically to the appropriate area as directed 2 (Two) Times a Day., Disp: 15 g, Rfl: 0     Allergies   Allergen Reactions   • Nickel Rash     BLISTERS/REDNESS/RASH ON SKIN   • Shellfish Allergy Hives, Diarrhea, Itching, Nausea And Vomiting, Rash, Dizziness and Confusion       Family History   Problem Relation Age of Onset   • Malig Hyperthermia Neg Hx    • Colon cancer Neg Hx         Social History     Social History Narrative   • Not on file       Immunization:  Immunization History   Administered Date(s) Administered   • COVID-19 (PFIZER) PURPLE CAP 04/22/2021, 05/13/2021   • Hep B, Adolescent or  "Pediatric 03/08/2002, 04/22/2002, 09/17/2002   • MMR 03/08/2002        Objective     Vital Signs:   /89 (BP Location: Left arm, Patient Position: Sitting, Cuff Size: Adult)   Pulse 66   Ht 162.6 cm (64.02\")   Wt 120 kg (264 lb 12.8 oz)   SpO2 99%   BMI 45.43 kg/m²       Physical Exam  Constitutional:       Appearance: Normal appearance.   HENT:      Head: Normocephalic.   Cardiovascular:      Rate and Rhythm: Normal rate and regular rhythm.      Heart sounds: Normal heart sounds.   Pulmonary:      Effort: Pulmonary effort is normal.      Breath sounds: Normal breath sounds.   Abdominal:      General: Bowel sounds are normal.      Palpations: Abdomen is soft.   Skin:     General: Skin is warm and dry.   Neurological:      Mental Status: She is alert and oriented to person, place, and time. Mental status is at baseline.   Psychiatric:         Mood and Affect: Mood normal.         Behavior: Behavior normal.         Thought Content: Thought content normal.         Judgment: Judgment normal.         Result Review :     CBC w/diff    CBC w/Diff 3/3/22 3/28/22 3/30/22   WBC 6.35 10.99 (A) 7.82   RBC 4.80 5.07 4.85   Hemoglobin 13.2 13.9 13.6   Hematocrit 40.3 41.0 40.3   MCV 84.0 80.9 83.1   MCH 27.5 27.4 28.0   MCHC 32.8 33.9 33.7   RDW 14.2 13.4 13.9   Platelets 216 238 198   Neutrophil Rel % 57.8 86.3 (A) 78.2 (A)   Immature Granulocyte Rel % 0.3 0.9 (A) 0.6 (A)   Lymphocyte Rel % 33.5 10.5 (A) 16.9 (A)   Monocyte Rel % 4.7 (A) 1.7 (A) 3.3 (A)   Eosinophil Rel % 3.1 0.3 0.6   Basophil Rel % 0.6 0.3 0.4   (A) Abnormal value            CMP    CMP 3/3/22 3/28/22 3/30/22   Glucose 92 140 (A) 140 (A)   BUN 14 12 19   Creatinine 0.83 0.70 0.88   Sodium 138 136 140   Potassium 4.5 3.7 3.3 (A)   Chloride 107 105 106   Calcium 9.4 10.0 9.5   Albumin 4.50 4.40 4.20   Total Bilirubin 0.3 0.3 0.6   Alkaline Phosphatase 59 67 62   AST (SGOT) 22 17 19   ALT (SGPT) 30 21 21   (A) Abnormal value                    "   Assessment and Plan    Diagnoses and all orders for this visit:    1. Gastroesophageal reflux disease, unspecified whether esophagitis present (Primary)    2. Nausea and vomiting, unspecified vomiting type    3. NSAID long-term use    4. Change in bowel habits    5. Diarrhea, unspecified type  -     Clostridioides difficile Toxin - Stool, Per Rectum; Future  -     Enteric Bacterial Panel - Stool, Per Rectum; Future  -     Enteric Parasite Panel - Stool, Per Rectum; Future  -     Occult Blood, Fecal By Immunoassay - Stool, Per Rectum; Future  -     Fecal Lactoferrin Qual. - Stool, Per Rectum; Future    6. Fecal urgency    Other orders  -     PEG 3350-KCl-NaBcb-NaCl-NaSulf (Golytely) 227.1 g pack; Take 4,000 mL by mouth 1 (One) Time for 1 dose. Take as directed by the office for colon prep  Dispense: 1 each; Refill: 0  -     pantoprazole (Protonix) 40 MG EC tablet; Take 1 tablet by mouth Daily for 90 days.  Dispense: 90 tablet; Refill: 1  -     sucralfate (Carafate) 1 g tablet; Take 1 tablet by mouth 4 (Four) Times a Day for 30 days.  Dispense: 120 tablet; Refill: 0    Started pt on Protonix 40mg daily and Carafate QID  Discontinue all NSAID use.  Stool studies due to diarrhea - pending results may consider colestid trial.   Proceed with EGD on 11/15/22 with Dr. Muniz and I have added a colonoscopy to this procedure.     Follow Up   No follow-ups on file.  Patient was given instructions and counseling regarding her condition or for health maintenance advice. Please see specific information pulled into the AVS if appropriate.

## 2022-08-18 NOTE — PATIENT INSTRUCTIONS
Gastroesophageal Reflux Disease, Adult    Gastroesophageal reflux (JAYLIN) happens when acid from the stomach flows up into the tube that connects the mouth and the stomach (esophagus). Normally, food travels down the esophagus and stays in the stomach to be digested. With JAYLIN, food and stomach acid sometimes move back up into the esophagus.  You may have a disease called gastroesophageal reflux disease (GERD) if the reflux:  Happens often.  Causes frequent or very bad symptoms.  Causes problems such as damage to the esophagus.  When this happens, the esophagus becomes sore and swollen. Over time, GERD can make small holes (ulcers) in the lining of the esophagus.  What are the causes?  This condition is caused by a problem with the muscle between the esophagus and the stomach. When this muscle is weak or not normal, it does not close properly to keep food and acid from coming back up from the stomach.  The muscle can be weak because of:  Tobacco use.  Pregnancy.  Having a certain type of hernia (hiatal hernia).  Alcohol use.  Certain foods and drinks, such as coffee, chocolate, onions, and peppermint.  What increases the risk?  Being overweight.  Having a disease that affects your connective tissue.  Taking NSAIDs, such a ibuprofen.  What are the signs or symptoms?  Heartburn.  Difficult or painful swallowing.  The feeling of having a lump in the throat.  A bitter taste in the mouth.  Bad breath.  Having a lot of saliva.  Having an upset or bloated stomach.  Burping.  Chest pain. Different conditions can cause chest pain. Make sure you see your doctor if you have chest pain.  Shortness of breath or wheezing.  A long-term cough or a cough at night.  Wearing away of the surface of teeth (tooth enamel).  Weight loss.  How is this treated?  Making changes to your diet.  Taking medicine.  Having surgery.  Treatment will depend on how bad your symptoms are.  Follow these instructions at home:  Eating and drinking    Follow a  diet as told by your doctor. You may need to avoid foods and drinks such as:  Coffee and tea, with or without caffeine.  Drinks that contain alcohol.  Energy drinks and sports drinks.  Bubbly (carbonated) drinks or sodas.  Chocolate and cocoa.  Peppermint and mint flavorings.  Garlic and onions.  Horseradish.  Spicy and acidic foods. These include peppers, chili powder, reese powder, vinegar, hot sauces, and BBQ sauce.  Citrus fruit juices and citrus fruits, such as oranges, deng, and limes.  Tomato-based foods. These include red sauce, chili, salsa, and pizza with red sauce.  Fried and fatty foods. These include donuts, french fries, potato chips, and high-fat dressings.  High-fat meats. These include hot dogs, rib eye steak, sausage, ham, and aleman.  High-fat dairy items, such as whole milk, butter, and cream cheese.  Eat small meals often. Avoid eating large meals.  Avoid drinking large amounts of liquid with your meals.  Avoid eating meals during the 2-3 hours before bedtime.  Avoid lying down right after you eat.  Do not exercise right after you eat.    Lifestyle    Do not smoke or use any products that contain nicotine or tobacco. If you need help quitting, ask your doctor.  Try to lower your stress. If you need help doing this, ask your doctor.  If you are overweight, lose an amount of weight that is healthy for you. Ask your doctor about a safe weight loss goal.    General instructions  Pay attention to any changes in your symptoms.  Take over-the-counter and prescription medicines only as told by your doctor.  Do not take aspirin, ibuprofen, or other NSAIDs unless your doctor says it is okay.  Wear loose clothes. Do not wear anything tight around your waist.  Raise (elevate) the head of your bed about 6 inches (15 cm). You may need to use a wedge to do this.  Avoid bending over if this makes your symptoms worse.  Keep all follow-up visits.  Contact a doctor if:  You have new symptoms.  You lose weight and  you do not know why.  You have trouble swallowing or it hurts to swallow.  You have wheezing or a cough that keeps happening.  You have a hoarse voice.  Your symptoms do not get better with treatment.  Get help right away if:  You have sudden pain in your arms, neck, jaw, teeth, or back.  You suddenly feel sweaty, dizzy, or light-headed.  You have chest pain or shortness of breath.  You vomit and the vomit is green, yellow, or black, or it looks like blood or coffee grounds.  You faint.  Your poop (stool) is red, bloody, or black.  You cannot swallow, drink, or eat.  These symptoms may represent a serious problem that is an emergency. Do not wait to see if the symptoms will go away. Get medical help right away. Call your local emergency services (911 in the [place] Do not drive yourself to the hospital.  Summary  If a person has gastroesophageal reflux disease (GERD), food and stomach acid move back up into the esophagus and cause symptoms or problems such as damage to the esophagus.  Treatment will depend on how bad your symptoms are.  Follow a diet as told by your doctor.  Take all medicines only as told by your doctor.  This information is not intended to replace advice given to you by your health care provider. Make sure you discuss any questions you have with your health care provider.  Document Revised: 06/28/2021 Document Reviewed: 06/28/2021  [employer] Patient Education © 2021 [employer]

## 2022-08-18 NOTE — TELEPHONE ENCOUNTER
Jenni KARON Reid  REASON FOR CALL EGD  Past Medical History:   Diagnosis Date   • Allergic    • Anxiety    • Depression    • Injury of back     LUMBAR 4/5   • PCOS (polycystic ovarian syndrome)    • PONV (postoperative nausea and vomiting)    • Sciatica of right side    • Seasonal allergies      Allergies   Allergen Reactions   • Nickel Rash     BLISTERS/REDNESS/RASH ON SKIN   • Shellfish Allergy Hives, Diarrhea, Itching, Nausea And Vomiting, Rash, Dizziness and Confusion     Past Surgical History:   Procedure Laterality Date   • ADENOIDECTOMY     • BACK SURGERY      DISCECTOMY X2 LUMBAR 5 S1   • LUMBAR DISCECTOMY Right 3/9/2022    Procedure: MINIMALLY INVASIVE LUMBAR DISCECTOMY, RIGHT APPROACH, LUMBAR 4-LUMBAR 5 FOR RECURRENT DISC HERNIATION;  Surgeon: Joseph Smith MD;  Location: [facility] MAIN OR;  Service: Neurosurgery;  Laterality: Right;   • TONSILLECTOMY       Social History     Socioeconomic History   • Marital status: Significant Other   Tobacco Use   • Smoking status: Former Smoker     Packs/day: 1.00     Years: 10.00     Pack years: 10.00     Types: Cigarettes     Quit date: 1/1/2019     Years since quitting: 3.6   • Smokeless tobacco: Never Used   • Tobacco comment: Pt quit smoking 2019   Vaping Use   • Vaping Use: Every day   • Substances: Nicotine, INST PER ANESTHESIA PROTOCOL. Last 3/9/22 at 0800   • Devices: Disposable   Substance and Sexual Activity   • Alcohol use: Not Currently     Comment: Occasional   • Drug use: Yes     Types: Marijuana     Comment: thc   • Sexual activity: Yes     Partners: Female     Family History   Problem Relation Age of Onset   • Malig Hyperthermia Neg Hx    • Colon cancer Neg Hx        Current Outpatient Medications:   •  albuterol sulfate  (90 Base) MCG/ACT inhaler, Inhale 2 puffs Every 6 (Six) Hours As Needed for Wheezing., Disp: 18 g, Rfl: 0  •  clindamycin-benzoyl peroxide (BenzaClin) 1-5 % gel, Apply 1 application topically to the appropriate area as directed 2  (Two) Times a Day., Disp: 35 g, Rfl: 0  •  cyclobenzaprine (FLEXERIL) 10 MG tablet, Take 1 tablet by mouth 3 (Three) Times a Day As Needed for Muscle Spasms., Disp: 30 tablet, Rfl: 1  •  dicyclomine (BENTYL) 20 MG tablet, Take 1 tablet by mouth Every 6 (Six) Hours., Disp: 20 tablet, Rfl: 0  •  hydrOXYzine (ATARAX) 25 MG tablet, Take 1 tablet by mouth 3 (Three) Times a Day As Needed for Anxiety., Disp: 90 tablet, Rfl: 2  •  omeprazole (priLOSEC) 40 MG capsule, Take 1 capsule by mouth Daily for 180 days., Disp: 90 capsule, Rfl: 1  •  ondansetron ODT (ZOFRAN-ODT) 8 MG disintegrating tablet, Place 1 tablet on the tongue 4 (Four) Times a Day As Needed for Nausea or Vomiting., Disp: 20 tablet, Rfl: 0  •  promethazine (PHENERGAN) 12.5 MG suppository, Insert 1 suppository into the rectum Every 6 (Six) Hours As Needed for Nausea or Vomiting., Disp: 12 each, Rfl: 0  •  sertraline (Zoloft) 100 MG tablet, Take 1 tablet by mouth Daily., Disp: 90 tablet, Rfl: 1  •  triamcinolone (KENALOG) 0.025 % ointment, Apply 1 application topically to the appropriate area as directed 2 (Two) Times a Day., Disp: 15 g, Rfl: 0

## 2022-09-01 DIAGNOSIS — F33.1 MODERATE EPISODE OF RECURRENT MAJOR DEPRESSIVE DISORDER: ICD-10-CM

## 2022-09-01 DIAGNOSIS — F41.9 ANXIETY: ICD-10-CM

## 2022-09-01 RX ORDER — HYDROXYZINE HYDROCHLORIDE 25 MG/1
25 TABLET, FILM COATED ORAL 3 TIMES DAILY PRN
Qty: 90 TABLET | Refills: 2 | Status: SHIPPED | OUTPATIENT
Start: 2022-09-01 | End: 2022-12-28

## 2022-09-01 RX ORDER — SERTRALINE HYDROCHLORIDE 100 MG/1
100 TABLET, FILM COATED ORAL DAILY
Qty: 90 TABLET | Refills: 1 | Status: SHIPPED | OUTPATIENT
Start: 2022-09-01 | End: 2022-10-26 | Stop reason: SDUPTHER

## 2022-09-28 ENCOUNTER — TELEPHONE (OUTPATIENT)
Dept: GASTROENTEROLOGY | Facility: CLINIC | Age: 32
End: 2022-09-28

## 2022-10-19 DIAGNOSIS — J01.40 ACUTE NON-RECURRENT PANSINUSITIS: Primary | ICD-10-CM

## 2022-10-19 RX ORDER — CEFDINIR 300 MG/1
300 CAPSULE ORAL 2 TIMES DAILY
Qty: 20 CAPSULE | Refills: 0 | Status: SHIPPED | OUTPATIENT
Start: 2022-10-19 | End: 2022-10-26

## 2022-10-19 RX ORDER — PREDNISONE 50 MG/1
50 TABLET ORAL DAILY
Qty: 5 TABLET | Refills: 0 | OUTPATIENT
Start: 2022-10-19 | End: 2022-11-11

## 2022-10-24 ENCOUNTER — TELEPHONE (OUTPATIENT)
Dept: FAMILY MEDICINE CLINIC | Facility: CLINIC | Age: 32
End: 2022-10-24

## 2022-10-24 NOTE — TELEPHONE ENCOUNTER
Informed pt that we have not seen her in office since May and we would need a in office visit to further exam this. Patient voiced understanding transferred up front to schedule appointment.

## 2022-10-24 NOTE — TELEPHONE ENCOUNTER
Caller: Jenni Reid    Relationship: Self    Best call back number: [phone]    What is the medical concern/diagnosis: SPOT ON BREAST    What specialty or service is being requested: MAMMOGRAM        What is the office location: [facility] [place]        Any additional details: PATIENT STATES THE TOPICAL CREAM HAS NOT CLEARED THE SPOT AND IT IS STILL PAINFUL. PATIENT WOULD LIKE A MAMMOGRAM TO EASE HER MIND.

## 2022-10-26 ENCOUNTER — OFFICE VISIT (OUTPATIENT)
Dept: FAMILY MEDICINE CLINIC | Facility: CLINIC | Age: 32
End: 2022-10-26

## 2022-10-26 VITALS
WEIGHT: 265.7 LBS | OXYGEN SATURATION: 97 % | HEART RATE: 98 BPM | TEMPERATURE: 98 F | HEIGHT: 64 IN | BODY MASS INDEX: 45.36 KG/M2 | SYSTOLIC BLOOD PRESSURE: 126 MMHG | RESPIRATION RATE: 18 BRPM | DIASTOLIC BLOOD PRESSURE: 90 MMHG

## 2022-10-26 DIAGNOSIS — N64.9 BREAST LESION: Primary | ICD-10-CM

## 2022-10-26 DIAGNOSIS — L98.8 SKIN LESION OF BREAST: ICD-10-CM

## 2022-10-26 PROCEDURE — 99213 OFFICE O/P EST LOW 20 MIN: CPT

## 2022-10-26 RX ORDER — SERTRALINE HYDROCHLORIDE 100 MG/1
100 TABLET, FILM COATED ORAL DAILY
Qty: 90 TABLET | Refills: 1 | Status: SHIPPED | OUTPATIENT
Start: 2022-10-26 | End: 2023-02-13

## 2022-10-26 NOTE — PROGRESS NOTES
"Jenni Reid presents to [facility] with complaints of persistent issues with area on right breast.      History of Present Illness  This is a 32-year-old female who presents to clinic with complaints of persistent issues with area on the right breast.    Patient was seen by me several months ago, earlier this year, with similar complaints of this.  Looks like a skin lesion, looks like a skin issue and was treated with clindamycin gel as well as some triamcinolone cream.  States it got a little bit better, but states been a persistent issue.  States that it is gotten worse, is draining some colored drainage, she is tried continuing using the gel and triamcinolone ointment, but not getting a lot of resolve.  States that the area of concern has persisted, would like this further evaluated.  States it is gotten harder as well, which is new.    The following portions of the patient's history were personally reviewed and updated as appropriate: allergies, current medications, past medical history, past surgical history, past family history, and past social history.       Objective   Vital Signs:   /90   Pulse 98   Temp 98 °F (36.7 °C)   Resp 18   Ht 162.6 cm (64.02\")   Wt 121 kg (265 lb 11.2 oz)   SpO2 97%   BMI 45.58 kg/m²     Body mass index is 45.58 kg/m².    All labs, imaging, test results, and specialty provider notes reviewed with patient.     Physical Exam  Vitals reviewed.   Constitutional:       Appearance: Normal appearance.   Chest:       Neurological:      General: No focal deficit present.      Mental Status: She is alert and oriented to person, place, and time.            Assessment and Plan:  Diagnoses and all orders for this visit:    1. Breast lesion (Primary)  -     US Breast Right Limited; Future  -     Ambulatory Referral to General Surgery    2. Skin lesion of breast  -     US Breast Right Limited; Future  -     Ambulatory Referral to General " Surgery    Other orders  -     sertraline (Zoloft) 100 MG tablet; Take 1 tablet by mouth Daily.  Dispense: 90 tablet; Refill: 1      Do still think this is related to a skin cyst/lesion, but refer to general surgery for further evaluation and management of this.  No visible drainage, no visible redness or erythema, do not think that it requires an antibiotic at this time.  We will also obtain an ultrasound to further evaluate, make sure that there is no other abnormalities found there, further treatment will based off general surgery's recommendations and further treatment.    Follow Up:  No follow-ups on file.    Patient was given instructions and counseling regarding her condition or for health maintenance advice. Please see specific information pulled into the AVS if appropriate.

## 2022-11-11 PROCEDURE — U0004 COV-19 TEST NON-CDC HGH THRU: HCPCS | Performed by: NURSE PRACTITIONER

## 2022-11-15 ENCOUNTER — ANESTHESIA (OUTPATIENT)
Dept: GASTROENTEROLOGY | Facility: HOSPITAL | Age: 32
End: 2022-11-15

## 2022-11-15 ENCOUNTER — HOSPITAL ENCOUNTER (OUTPATIENT)
Facility: HOSPITAL | Age: 32
Setting detail: HOSPITAL OUTPATIENT SURGERY
Discharge: HOME | End: 2022-11-15
Attending: INTERNAL MEDICINE | Admitting: INTERNAL MEDICINE

## 2022-11-15 ENCOUNTER — ANESTHESIA EVENT (OUTPATIENT)
Dept: GASTROENTEROLOGY | Facility: HOSPITAL | Age: 32
End: 2022-11-15

## 2022-11-15 VITALS
TEMPERATURE: 98.3 F | HEART RATE: 75 BPM | WEIGHT: 260.14 LBS | BODY MASS INDEX: 44.65 KG/M2 | DIASTOLIC BLOOD PRESSURE: 88 MMHG | OXYGEN SATURATION: 100 % | SYSTOLIC BLOOD PRESSURE: 102 MMHG | RESPIRATION RATE: 16 BRPM

## 2022-11-15 DIAGNOSIS — K21.9 GASTROESOPHAGEAL REFLUX DISEASE, UNSPECIFIED WHETHER ESOPHAGITIS PRESENT: ICD-10-CM

## 2022-11-15 PROCEDURE — 88342 IMHCHEM/IMCYTCHM 1ST ANTB: CPT | Performed by: INTERNAL MEDICINE

## 2022-11-15 PROCEDURE — 25010000002 PROPOFOL 10 MG/ML EMULSION

## 2022-11-15 PROCEDURE — 88305 TISSUE EXAM BY PATHOLOGIST: CPT | Performed by: INTERNAL MEDICINE

## 2022-11-15 PROCEDURE — 45380 COLONOSCOPY AND BIOPSY: CPT | Performed by: INTERNAL MEDICINE

## 2022-11-15 PROCEDURE — 43239 EGD BIOPSY SINGLE/MULTIPLE: CPT | Performed by: INTERNAL MEDICINE

## 2022-11-15 RX ORDER — LIDOCAINE HYDROCHLORIDE 20 MG/ML
INJECTION, SOLUTION EPIDURAL; INFILTRATION; INTRACAUDAL; PERINEURAL AS NEEDED
Status: DISCONTINUED | OUTPATIENT
Start: 2022-11-15 | End: 2022-11-15 | Stop reason: SURG

## 2022-11-15 RX ORDER — PROPOFOL 10 MG/ML
VIAL (ML) INTRAVENOUS AS NEEDED
Status: DISCONTINUED | OUTPATIENT
Start: 2022-11-15 | End: 2022-11-15 | Stop reason: SURG

## 2022-11-15 RX ORDER — SODIUM CHLORIDE, SODIUM LACTATE, POTASSIUM CHLORIDE, CALCIUM CHLORIDE 600; 310; 30; 20 MG/100ML; MG/100ML; MG/100ML; MG/100ML
30 INJECTION, SOLUTION INTRAVENOUS CONTINUOUS
Status: DISCONTINUED | OUTPATIENT
Start: 2022-11-15 | End: 2022-11-15 | Stop reason: HOSPADM

## 2022-11-15 RX ADMIN — LIDOCAINE HYDROCHLORIDE 60 MG: 20 INJECTION, SOLUTION EPIDURAL; INFILTRATION; INTRACAUDAL; PERINEURAL at 11:08

## 2022-11-15 RX ADMIN — PROPOFOL 250 MCG/KG/MIN: 10 INJECTION, EMULSION INTRAVENOUS at 11:08

## 2022-11-15 RX ADMIN — SODIUM CHLORIDE, POTASSIUM CHLORIDE, SODIUM LACTATE AND CALCIUM CHLORIDE 30 ML/HR: 600; 310; 30; 20 INJECTION, SOLUTION INTRAVENOUS at 10:37

## 2022-11-15 RX ADMIN — PROPOFOL 100 MG: 10 INJECTION, EMULSION INTRAVENOUS at 11:08

## 2022-11-15 NOTE — ANESTHESIA POSTPROCEDURE EVALUATION
Patient: Jenni Reid    Procedure Summary     Date: 11/15/22 Room / Location: [facility] ENDOSCOPY 4 / [facility] ENDOSCOPY    Anesthesia Start: 1104 Anesthesia Stop: 1142    Procedures:       ESOPHAGOGASTRODUODENOSCOPY WITH BIOPSIES      COLONOSCOPY WITH BIOPSIES Diagnosis:       Gastroesophageal reflux disease, unspecified whether esophagitis present      (Gastroesophageal reflux disease, unspecified whether esophagitis present [K21.9])    Surgeons: Dimitrios Muniz MD Provider: Guilherme Bill MD    Anesthesia Type: general ASA Status: 2          Anesthesia Type: general    Vitals  Vitals Value Taken Time   /88 11/15/22 1156   Temp 36.8 °C (98.3 °F) 11/15/22 1156   Pulse 75 11/15/22 1156   Resp 16 11/15/22 1156   SpO2 100 % 11/15/22 1156           Post Anesthesia Care and Evaluation    Patient location during evaluation: bedside  Patient participation: complete - patient participated  Level of consciousness: awake  Pain management: adequate    Airway patency: patent  Anesthetic complications: No anesthetic complications  PONV Status: none  Cardiovascular status: acceptable  Respiratory status: acceptable  Hydration status: acceptable    Comments: An Anesthesiologist personally participated in the most demanding procedures (including induction and emergence if applicable) in the anesthesia plan, monitored the course of anesthesia administration at frequent intervals and remained physically present and available for immediate diagnosis and treatment of emergencies.

## 2022-11-15 NOTE — H&P
Pre Procedure History & Physical    Chief Complaint:   Heartburn and diarrhea    Subjective     HPI:   Heartburn and diarrhea    Past Medical History:   Past Medical History:   Diagnosis Date   • Allergic    • Anxiety    • Depression    • Diarrhea    • GERD (gastroesophageal reflux disease)    • Injury of back     LUMBAR 4/5   • Nausea & vomiting    • PCOS (polycystic ovarian syndrome)    • PONV (postoperative nausea and vomiting)    • Sciatica of right side    • Seasonal allergies        Past Surgical History:  Past Surgical History:   Procedure Laterality Date   • ADENOIDECTOMY     • BACK SURGERY      DISCECTOMY X2 LUMBAR 5 S1   • LUMBAR DISCECTOMY Right 3/9/2022    Procedure: MINIMALLY INVASIVE LUMBAR DISCECTOMY, RIGHT APPROACH, LUMBAR 4-LUMBAR 5 FOR RECURRENT DISC HERNIATION;  Surgeon: Joseph Smith MD;  Location: [facility] MAIN OR;  Service: Neurosurgery;  Laterality: Right;   • TONSILLECTOMY         Family History:  Family History   Problem Relation Age of Onset   • Malig Hyperthermia Neg Hx    • Colon cancer Neg Hx        Social History:   reports that she quit smoking about 3 years ago. Her smoking use included cigarettes. She has a 10.00 pack-year smoking history. She has never used smokeless tobacco. She reports that she does not currently use alcohol. She reports current drug use. Drug: Marijuana.    Medications:   Medications Prior to Admission   Medication Sig Dispense Refill Last Dose   • albuterol sulfate  (90 Base) MCG/ACT inhaler Inhale 2 puffs Every 6 (Six) Hours As Needed for Wheezing. 18 g 0    • clindamycin-benzoyl peroxide (BenzaClin) 1-5 % gel Apply 1 application topically to the appropriate area as directed 2 (Two) Times a Day. (Patient taking differently: Apply 1 application topically to the appropriate area as directed As Needed.) 35 g 0    • hydrOXYzine (ATARAX) 25 MG tablet Take 1 tablet by mouth 3 (Three) Times a Day As Needed for Anxiety. 90 tablet 2    • ondansetron ODT  (ZOFRAN-ODT) 4 MG disintegrating tablet Place 1 tablet on the tongue 4 (Four) Times a Day As Needed for Nausea or Vomiting. 12 tablet 0    • pantoprazole (Protonix) 40 MG EC tablet Take 1 tablet by mouth Daily for 90 days. 90 tablet 1    • sertraline (Zoloft) 100 MG tablet Take 1 tablet by mouth Daily. 90 tablet 1        Allergies:  Nickel and Shellfish allergy        Objective     Blood pressure 129/96, pulse 61, temperature 98.2 °F (36.8 °C), temperature source Temporal, resp. rate 18, weight 118 kg (260 lb 2.3 oz), last menstrual period 11/10/2022, SpO2 96 %, not currently breastfeeding.    Physical Exam   Constitutional: Pt is oriented to person, place, and time and well-developed, well-nourished, and in no distress.   Mouth/Throat: Oropharynx is clear and moist.   Neck: Normal range of motion.   Cardiovascular: Normal rate, regular rhythm and normal heart sounds.    Pulmonary/Chest: Effort normal and breath sounds normal.   Abdominal: Soft. Nontender  Skin: Skin is warm and dry.   Psychiatric: Mood, memory, affect and judgment normal.     Assessment & Plan     Diagnosis:  Heartburn and chronic diarrhea    Anticipated Surgical Procedure:  EGD and colonoscopy    The risks, benefits, and alternatives of this procedure have been discussed with the patient or the responsible party- the patient understands and agrees to proceed.

## 2022-11-15 NOTE — NURSING NOTE
Both dr. guerrero and dr. peter have talked to patient about refusing pregnancy test and have decided to proceed on with the procedure.

## 2022-11-15 NOTE — ANESTHESIA PREPROCEDURE EVALUATION
Anesthesia Evaluation     Patient summary reviewed and Nursing notes reviewed                Airway   Mallampati: I  TM distance: >3 FB  Neck ROM: full  No difficulty expected  Dental      Pulmonary - negative pulmonary ROS and normal exam    breath sounds clear to auscultation  Cardiovascular - negative cardio ROS and normal exam    Rhythm: regular  Rate: normal        Neuro/Psych  (+) numbness, psychiatric history Anxiety and Depression,    GI/Hepatic/Renal/Endo    (+) morbid obesity, GERD,      Musculoskeletal (-) negative ROS    Abdominal    Substance History - negative use     OB/GYN negative ob/gyn ROS         Other                        Anesthesia Plan    ASA 2     general     intravenous induction     Anesthetic plan, risks, benefits, and alternatives have been provided, discussed and informed consent has been obtained with: patient.        CODE STATUS:

## 2022-11-16 LAB
CYTO UR: NORMAL
LAB AP CASE REPORT: NORMAL
LAB AP CLINICAL INFORMATION: NORMAL
LAB AP SPECIAL STAINS: NORMAL
PATH REPORT.FINAL DX SPEC: NORMAL
PATH REPORT.GROSS SPEC: NORMAL

## 2022-11-23 ENCOUNTER — TELEPHONE (OUTPATIENT)
Dept: GASTROENTEROLOGY | Facility: CLINIC | Age: 32
End: 2022-11-23

## 2022-11-23 NOTE — TELEPHONE ENCOUNTER
Called pt to follow up on overdue results for stool studies. Pt is agreeable to completing test/s.

## 2022-11-29 ENCOUNTER — APPOINTMENT (OUTPATIENT)
Dept: ULTRASOUND IMAGING | Facility: HOSPITAL | Age: 32
End: 2022-11-29

## 2022-11-29 PROCEDURE — 87147 CULTURE TYPE IMMUNOLOGIC: CPT | Performed by: FAMILY MEDICINE

## 2022-11-29 PROCEDURE — 87186 SC STD MICRODIL/AGAR DIL: CPT | Performed by: FAMILY MEDICINE

## 2022-11-29 PROCEDURE — 87070 CULTURE OTHR SPECIMN AEROBIC: CPT | Performed by: FAMILY MEDICINE

## 2022-11-29 PROCEDURE — 87205 SMEAR GRAM STAIN: CPT | Performed by: FAMILY MEDICINE

## 2022-11-30 ENCOUNTER — TELEPHONE (OUTPATIENT)
Dept: GASTROENTEROLOGY | Facility: CLINIC | Age: 32
End: 2022-11-30

## 2022-11-30 NOTE — TELEPHONE ENCOUNTER
----- Message from JEROMY Cuba sent at 11/29/2022  4:33 PM EST -----  EGD 11/15/2022: Small hiatal hernia, normal stomach-biopsy with chronic inactive gastritis/negative, erosions noted in the duodenum-biopsy showed duodenitis, recommended no NSAIDs  Colonoscopy 11/15/2022:Negative colonoscopy-random colon biopsies also negative    Schedule follow-up if still having any GI symptoms, patient was given Protonix at visit in August would recommend she continue this for 2 months and avoid NSAIDs

## 2022-11-30 NOTE — TELEPHONE ENCOUNTER
Spoke to pt and informed of Melissa PINZON result note and recommendations. Verified understanding.   Verified that pt has stopped taking NSAIDS and is taking Protonix. Pt does not report any concerns or issues at this time.

## 2022-12-02 ENCOUNTER — TELEPHONE (OUTPATIENT)
Dept: URGENT CARE | Facility: CLINIC | Age: 32
End: 2022-12-02

## 2022-12-25 DIAGNOSIS — F41.9 ANXIETY: ICD-10-CM

## 2022-12-25 DIAGNOSIS — F33.1 MODERATE EPISODE OF RECURRENT MAJOR DEPRESSIVE DISORDER: ICD-10-CM

## 2022-12-28 RX ORDER — HYDROXYZINE HYDROCHLORIDE 25 MG/1
TABLET, FILM COATED ORAL
Qty: 90 TABLET | Refills: 2 | Status: SHIPPED | OUTPATIENT
Start: 2022-12-28

## 2022-12-29 ENCOUNTER — TELEPHONE (OUTPATIENT)
Dept: GASTROENTEROLOGY | Facility: CLINIC | Age: 32
End: 2022-12-29

## 2022-12-29 NOTE — TELEPHONE ENCOUNTER
Called patient to follow up on overdue stool studies. Patient did not answer. Left patient a voicemail and call back number

## 2023-02-13 RX ORDER — SERTRALINE HYDROCHLORIDE 100 MG/1
200 TABLET, FILM COATED ORAL DAILY
Qty: 180 TABLET | Refills: 1 | Status: SHIPPED | OUTPATIENT
Start: 2023-02-13

## 2023-04-06 ENCOUNTER — TRANSCRIBE ORDERS (OUTPATIENT)
Dept: ADMINISTRATIVE | Facility: HOSPITAL | Age: 33
End: 2023-04-06
Payer: COMMERCIAL

## 2023-04-06 ENCOUNTER — TELEPHONE (OUTPATIENT)
Dept: URGENT CARE | Facility: CLINIC | Age: 33
End: 2023-04-06
Payer: COMMERCIAL

## 2023-04-06 ENCOUNTER — LAB (OUTPATIENT)
Dept: LAB | Facility: HOSPITAL | Age: 33
End: 2023-04-06
Payer: COMMERCIAL

## 2023-04-06 DIAGNOSIS — N92.6 MISSED PERIODS: Primary | ICD-10-CM

## 2023-04-06 PROCEDURE — 84703 CHORIONIC GONADOTROPIN ASSAY: CPT | Performed by: STUDENT IN AN ORGANIZED HEALTH CARE EDUCATION/TRAINING PROGRAM

## 2023-04-06 NOTE — TELEPHONE ENCOUNTER
----- Message from JEROMY Chaparro sent at 4/6/2023  1:01 PM EDT -----  Please notify patient of negative hCG test, no pregnancy.

## 2023-04-11 ENCOUNTER — TELEMEDICINE (OUTPATIENT)
Dept: FAMILY MEDICINE CLINIC | Facility: CLINIC | Age: 33
End: 2023-04-11
Payer: COMMERCIAL

## 2023-04-11 DIAGNOSIS — R10.13 EPIGASTRIC PAIN: Primary | ICD-10-CM

## 2023-04-11 DIAGNOSIS — F41.9 ANXIETY: ICD-10-CM

## 2023-04-11 DIAGNOSIS — Z01.89 ROUTINE LAB DRAW: ICD-10-CM

## 2023-04-11 DIAGNOSIS — F33.1 MODERATE EPISODE OF RECURRENT MAJOR DEPRESSIVE DISORDER: ICD-10-CM

## 2023-04-11 DIAGNOSIS — Z13.220 SCREENING FOR LIPID DISORDERS: ICD-10-CM

## 2023-04-11 DIAGNOSIS — R11.2 NAUSEA AND VOMITING, UNSPECIFIED VOMITING TYPE: ICD-10-CM

## 2023-04-11 RX ORDER — ESCITALOPRAM OXALATE 20 MG/1
20 TABLET ORAL DAILY
Qty: 90 TABLET | Refills: 1 | Status: SHIPPED | OUTPATIENT
Start: 2023-04-11

## 2023-04-11 NOTE — PROGRESS NOTES
Jenni Reid presents to [facility] with complaints of epigastric abdominal pain, right upper quadrant abdominal pain, nausea and vomiting, and would like to switch antidepressant.    You have chosen to receive care through a telephone visit. Do you consent to use a telephone visit for your medical care today? YES    This video was conducted via audiovisual display, patient was present outside at work alone, I was present in the office in a patient room alone.    This was an audio and video enabled telemedicine encounter.     History of Present Illness  This is a 32-year-old female who presents to clinic with complaints of epigastric abdominal pain, right upper quadrant abdominal pain, nausea vomiting, would like to switch antidepressant.    GI issues: Patient has had a lot of issues with her GI tract in the past, has actually had pretty thorough work-up of this done, recently back in November actually had an upper and lower scope which were inconclusive for any findings.  Originally thought that her symptoms were related to her gallbladder about this time last year, patient was experiencing a lot of severe acid reflux, some epigastric pain, but everything came back negative on her scans that she had at that time.  Patient states that her symptoms persist.  States that if she eats anything that is not bland in nature, she was subsequently get really sick to her stomach, and will vomit.  Patient states that she also was having during that time epigastric pain, and even some right upper quadrant abdominal pain.  She states that she has not had a lot of issues with her bowels, but these other symptoms are becoming a problem and is definitely bothering her.  She is wondering what else that she needs to do, its been a while since she had any blood work done, is been a while since she had any work-up of this done.  Did have ordered a gastric emptying study, but was never actually  scheduled by patient.     Anxiety/depression: Patient states that the Zoloft seems to be working pretty well for her in regards to controlling her anxiety and depression, but states that she does feel like she keeps getting random fevers, and would like to switch to a different antidepressant.  States that her girlfriend went through this similarly, was found that it was the medication, and would like to try something different.  States that she had good things about Lexapro, was wondering if she can try that medication instead.  Is currently taken 200 mg of Zoloft.  Currently denies any suicidal thoughts or ideation    The following portions of the patient's history were personally reviewed and updated as appropriate: allergies, current medications, past medical history, past surgical history, past family history, and past social history.       Objective     All labs, imaging, test results, and specialty provider notes reviewed with patient.     Physical Exam  Constitutional:       Appearance: Normal appearance.   Pulmonary:      Effort: Pulmonary effort is normal.   Skin:     General: Skin is warm and dry.   Neurological:      General: No focal deficit present.      Mental Status: She is alert and oriented to person, place, and time.   Psychiatric:         Mood and Affect: Mood normal.         Behavior: Behavior normal.            Assessment and Plan:  Diagnoses and all orders for this visit:    1. Epigastric pain (Primary)  -     Lipase; Future  -     US Abdomen Complete; Future    2. Nausea and vomiting, unspecified vomiting type  -     Lipase; Future  -     US Abdomen Complete; Future    3. Anxiety  -     escitalopram (Lexapro) 20 MG tablet; Take 1 tablet by mouth Daily.  Dispense: 90 tablet; Refill: 1    4. Moderate episode of recurrent major depressive disorder  -     escitalopram (Lexapro) 20 MG tablet; Take 1 tablet by mouth Daily.  Dispense: 90 tablet; Refill: 1    5. Routine lab draw  -     CBC Auto  Differential; Future  -     Comprehensive Metabolic Panel; Future  -     TSH Rfx On Abnormal To Free T4; Future  -     Urinalysis With Culture If Indicated -; Future    6. Screening for lipid disorders  -     Lipid Panel; Future      As far as GI symptoms go, it has been a while since we have done any work-up, so we will go ahead and obtain blood work as I do think that patient needs routine blood work done just to make sure no abnormalities there could be contributing to patient's symptoms.  We will also reevaluate an abdominal ultrasound to rule out any gallbladder origin, do question whether patient could have some gastroparesis, and can consider working this up further if abdominal ultrasound is negative.  At that time, may reorder the gastric emptying study that was never completed by patient in June of last year.    We will also go ahead and transition patient from Zoloft to Lexapro, discussed with her that because she is transitioning in the same drug class, that we can just do it even swap from 200 mg of Zoloft to 20 mg of Lexapro.  Discussed with her to give this medicine about a month to get in system to truly notice a difference, should improve symptoms thereafter.    Follow Up:  No follow-ups on file.    Patient was given instructions and counseling regarding her condition or for health maintenance advice. Please see specific information pulled into the AVS if appropriate.     Total Time Spent: 15 minutes

## 2023-04-21 ENCOUNTER — APPOINTMENT (OUTPATIENT)
Dept: CT IMAGING | Facility: HOSPITAL | Age: 33
End: 2023-04-21
Payer: COMMERCIAL

## 2023-04-21 ENCOUNTER — HOSPITAL ENCOUNTER (EMERGENCY)
Facility: HOSPITAL | Age: 33
Discharge: HOME | End: 2023-04-21
Attending: STUDENT IN AN ORGANIZED HEALTH CARE EDUCATION/TRAINING PROGRAM
Payer: COMMERCIAL

## 2023-04-21 VITALS
HEART RATE: 66 BPM | HEIGHT: 64 IN | WEIGHT: 249.56 LBS | DIASTOLIC BLOOD PRESSURE: 75 MMHG | TEMPERATURE: 98.7 F | RESPIRATION RATE: 22 BRPM | OXYGEN SATURATION: 91 % | BODY MASS INDEX: 42.61 KG/M2 | SYSTOLIC BLOOD PRESSURE: 116 MMHG

## 2023-04-21 DIAGNOSIS — A08.4 VIRAL GASTROENTERITIS: Primary | ICD-10-CM

## 2023-04-21 LAB
ALBUMIN SERPL-MCNC: 4.3 G/DL (ref 3.5–5.2)
ALBUMIN/GLOB SERPL: 1.4 G/DL
ALP SERPL-CCNC: 65 U/L (ref 39–117)
ALT SERPL W P-5'-P-CCNC: 18 U/L (ref 1–33)
ANION GAP SERPL CALCULATED.3IONS-SCNC: 14.9 MMOL/L (ref 5–15)
AST SERPL-CCNC: 19 U/L (ref 1–32)
BASOPHILS # BLD AUTO: 0.04 10*3/MM3 (ref 0–0.2)
BASOPHILS NFR BLD AUTO: 0.4 % (ref 0–1.5)
BILIRUB SERPL-MCNC: 0.4 MG/DL (ref 0–1.2)
BILIRUB UR QL STRIP: NEGATIVE
BUN SERPL-MCNC: 14 MG/DL (ref 6–20)
BUN/CREAT SERPL: 15.2 (ref 7–25)
CALCIUM SPEC-SCNC: 10.1 MG/DL (ref 8.6–10.5)
CHLORIDE SERPL-SCNC: 109 MMOL/L (ref 98–107)
CLARITY UR: CLEAR
CO2 SERPL-SCNC: 18.1 MMOL/L (ref 22–29)
COLOR UR: YELLOW
CREAT SERPL-MCNC: 0.92 MG/DL (ref 0.57–1)
DEPRECATED RDW RBC AUTO: 40.5 FL (ref 37–54)
EGFRCR SERPLBLD CKD-EPI 2021: 85.5 ML/MIN/1.73
EOSINOPHIL # BLD AUTO: 0.04 10*3/MM3 (ref 0–0.4)
EOSINOPHIL NFR BLD AUTO: 0.4 % (ref 0.3–6.2)
ERYTHROCYTE [DISTWIDTH] IN BLOOD BY AUTOMATED COUNT: 13.5 % (ref 12.3–15.4)
GLOBULIN UR ELPH-MCNC: 3.1 GM/DL
GLUCOSE SERPL-MCNC: 126 MG/DL (ref 65–99)
GLUCOSE UR STRIP-MCNC: NEGATIVE MG/DL
HCG INTACT+B SERPL-ACNC: <0.5 MIU/ML
HCT VFR BLD AUTO: 41.4 % (ref 34–46.6)
HGB BLD-MCNC: 14.2 G/DL (ref 12–15.9)
HGB UR QL STRIP.AUTO: NEGATIVE
HOLD SPECIMEN: NORMAL
HOLD SPECIMEN: NORMAL
IMM GRANULOCYTES # BLD AUTO: 0.03 10*3/MM3 (ref 0–0.05)
IMM GRANULOCYTES NFR BLD AUTO: 0.3 % (ref 0–0.5)
KETONES UR QL STRIP: ABNORMAL
LEUKOCYTE ESTERASE UR QL STRIP.AUTO: NEGATIVE
LIPASE SERPL-CCNC: 14 U/L (ref 13–60)
LYMPHOCYTES # BLD AUTO: 2.05 10*3/MM3 (ref 0.7–3.1)
LYMPHOCYTES NFR BLD AUTO: 19.1 % (ref 19.6–45.3)
MCH RBC QN AUTO: 28.5 PG (ref 26.6–33)
MCHC RBC AUTO-ENTMCNC: 34.3 G/DL (ref 31.5–35.7)
MCV RBC AUTO: 83 FL (ref 79–97)
MONOCYTES # BLD AUTO: 0.36 10*3/MM3 (ref 0.1–0.9)
MONOCYTES NFR BLD AUTO: 3.4 % (ref 5–12)
NEUTROPHILS NFR BLD AUTO: 76.4 % (ref 42.7–76)
NEUTROPHILS NFR BLD AUTO: 8.2 10*3/MM3 (ref 1.7–7)
NITRITE UR QL STRIP: NEGATIVE
NRBC BLD AUTO-RTO: 0 /100 WBC (ref 0–0.2)
PH UR STRIP.AUTO: >=9 [PH] (ref 5–8)
PLATELET # BLD AUTO: 211 10*3/MM3 (ref 140–450)
PMV BLD AUTO: 12 FL (ref 6–12)
POTASSIUM SERPL-SCNC: 4.1 MMOL/L (ref 3.5–5.2)
PROT SERPL-MCNC: 7.4 G/DL (ref 6–8.5)
PROT UR QL STRIP: ABNORMAL
RBC # BLD AUTO: 4.99 10*6/MM3 (ref 3.77–5.28)
SODIUM SERPL-SCNC: 142 MMOL/L (ref 136–145)
SP GR UR STRIP: >1.03 (ref 1–1.03)
UROBILINOGEN UR QL STRIP: ABNORMAL
WBC NRBC COR # BLD: 10.72 10*3/MM3 (ref 3.4–10.8)
WHOLE BLOOD HOLD COAG: NORMAL
WHOLE BLOOD HOLD SPECIMEN: NORMAL

## 2023-04-21 PROCEDURE — 25010000002 METOCLOPRAMIDE PER 10 MG: Performed by: EMERGENCY MEDICINE

## 2023-04-21 PROCEDURE — 83690 ASSAY OF LIPASE: CPT

## 2023-04-21 PROCEDURE — 85025 COMPLETE CBC W/AUTO DIFF WBC: CPT

## 2023-04-21 PROCEDURE — 80053 COMPREHEN METABOLIC PANEL: CPT

## 2023-04-21 PROCEDURE — 96375 TX/PRO/DX INJ NEW DRUG ADDON: CPT

## 2023-04-21 PROCEDURE — 25010000002 DIPHENHYDRAMINE PER 50 MG: Performed by: EMERGENCY MEDICINE

## 2023-04-21 PROCEDURE — 25010000002 PROCHLORPERAZINE 10 MG/2ML SOLUTION: Performed by: EMERGENCY MEDICINE

## 2023-04-21 PROCEDURE — 25010000002 ONDANSETRON PER 1 MG: Performed by: EMERGENCY MEDICINE

## 2023-04-21 PROCEDURE — 84702 CHORIONIC GONADOTROPIN TEST: CPT

## 2023-04-21 PROCEDURE — 99284 EMERGENCY DEPT VISIT MOD MDM: CPT

## 2023-04-21 PROCEDURE — 25010000002 DICYCLOMINE PER 20 MG: Performed by: EMERGENCY MEDICINE

## 2023-04-21 PROCEDURE — 25510000001 IOPAMIDOL PER 1 ML: Performed by: EMERGENCY MEDICINE

## 2023-04-21 PROCEDURE — 96374 THER/PROPH/DIAG INJ IV PUSH: CPT

## 2023-04-21 PROCEDURE — 96372 THER/PROPH/DIAG INJ SC/IM: CPT

## 2023-04-21 PROCEDURE — 74177 CT ABD & PELVIS W/CONTRAST: CPT

## 2023-04-21 PROCEDURE — 81003 URINALYSIS AUTO W/O SCOPE: CPT | Performed by: STUDENT IN AN ORGANIZED HEALTH CARE EDUCATION/TRAINING PROGRAM

## 2023-04-21 PROCEDURE — 25010000002 MORPHINE PER 10 MG: Performed by: EMERGENCY MEDICINE

## 2023-04-21 RX ORDER — METOCLOPRAMIDE HYDROCHLORIDE 5 MG/ML
10 INJECTION INTRAMUSCULAR; INTRAVENOUS ONCE
Status: COMPLETED | OUTPATIENT
Start: 2023-04-21 | End: 2023-04-21

## 2023-04-21 RX ORDER — PROCHLORPERAZINE EDISYLATE 5 MG/ML
10 INJECTION INTRAMUSCULAR; INTRAVENOUS EVERY 6 HOURS PRN
Status: DISCONTINUED | OUTPATIENT
Start: 2023-04-21 | End: 2023-04-22 | Stop reason: HOSPADM

## 2023-04-21 RX ORDER — SODIUM CHLORIDE 0.9 % (FLUSH) 0.9 %
10 SYRINGE (ML) INJECTION AS NEEDED
Status: DISCONTINUED | OUTPATIENT
Start: 2023-04-21 | End: 2023-04-22 | Stop reason: HOSPADM

## 2023-04-21 RX ORDER — DIPHENHYDRAMINE HYDROCHLORIDE 50 MG/ML
25 INJECTION INTRAMUSCULAR; INTRAVENOUS ONCE
Status: COMPLETED | OUTPATIENT
Start: 2023-04-21 | End: 2023-04-21

## 2023-04-21 RX ORDER — PROCHLORPERAZINE MALEATE 5 MG/1
5 TABLET ORAL EVERY 6 HOURS PRN
Qty: 20 TABLET | Refills: 0 | Status: SHIPPED | OUTPATIENT
Start: 2023-04-21

## 2023-04-21 RX ORDER — DICYCLOMINE HCL 20 MG
20 TABLET ORAL EVERY 6 HOURS
Qty: 20 TABLET | Refills: 0 | Status: SHIPPED | OUTPATIENT
Start: 2023-04-21

## 2023-04-21 RX ORDER — ONDANSETRON 2 MG/ML
4 INJECTION INTRAMUSCULAR; INTRAVENOUS ONCE
Status: COMPLETED | OUTPATIENT
Start: 2023-04-21 | End: 2023-04-21

## 2023-04-21 RX ORDER — DICYCLOMINE HYDROCHLORIDE 10 MG/ML
20 INJECTION INTRAMUSCULAR ONCE
Status: COMPLETED | OUTPATIENT
Start: 2023-04-21 | End: 2023-04-21

## 2023-04-21 RX ADMIN — DIPHENHYDRAMINE HYDROCHLORIDE 25 MG: 50 INJECTION, SOLUTION INTRAMUSCULAR; INTRAVENOUS at 20:30

## 2023-04-21 RX ADMIN — DICYCLOMINE HYDROCHLORIDE 20 MG: 20 INJECTION, SOLUTION INTRAMUSCULAR at 20:32

## 2023-04-21 RX ADMIN — ONDANSETRON 4 MG: 2 INJECTION INTRAMUSCULAR; INTRAVENOUS at 19:25

## 2023-04-21 RX ADMIN — PROCHLORPERAZINE EDISYLATE 10 MG: 5 INJECTION, SOLUTION INTRAMUSCULAR; INTRAVENOUS at 21:45

## 2023-04-21 RX ADMIN — METOCLOPRAMIDE 10 MG: 5 INJECTION, SOLUTION INTRAMUSCULAR; INTRAVENOUS at 20:28

## 2023-04-21 RX ADMIN — IOPAMIDOL 100 ML: 755 INJECTION, SOLUTION INTRAVENOUS at 19:47

## 2023-04-21 RX ADMIN — SODIUM CHLORIDE 1000 ML: 9 INJECTION, SOLUTION INTRAVENOUS at 19:25

## 2023-04-21 RX ADMIN — MORPHINE SULFATE 4 MG: 4 INJECTION, SOLUTION INTRAMUSCULAR; INTRAVENOUS at 21:45

## 2023-04-21 NOTE — ED NOTES
Presents from home for generalized abd pain since 8am today, vomiting/ nausea, ems gave 12.5 of IV phenergan enroute

## 2023-04-21 NOTE — Clinical Note
[facility] EMERGENCY ROOM  [address]  [place] [address]  Phone: [phone]    Jenni Reid was seen and treated in our emergency department on 4/21/2023.  She may return to work on 04/25/2023.         Thank you for choosing [facility].    Tushar Crowe PA-C

## 2023-04-22 NOTE — ED NOTES
Medications ineffective. Patient continues to dry heave persistently. Patient is requesting medication for stomach cramping.

## 2023-04-22 NOTE — ED PROVIDER NOTES
Time: 9:31 PM EDT  Date of encounter:  4/21/2023  Independent Historian/Clinical History and Information was obtained by:   Patient  Chief Complaint   Patient presents with   • Abdominal Pain   • Vomiting   • Nausea       History is limited by: N/A    History of Present Illness:  Patient is a 32 y.o. year old female who presents to the emergency department for evaluation of epigastric abdominal pain, nausea and vomiting that started this morning.  Patient states that abdominal pain is not caused from retching.  Patient denies recent travel or changes of food.  She denies dysuria, hematuria or diarrhea.    HPI    Patient Care Team  Primary Care Provider: Thu Wallis APRN    Past Medical History:     Allergies   Allergen Reactions   • Nickel Rash     BLISTERS/REDNESS/RASH ON SKIN   • Shellfish Allergy Hives, Diarrhea, Itching, Nausea And Vomiting, Rash, Dizziness and Confusion     Past Medical History:   Diagnosis Date   • Allergic    • Anxiety    • Depression    • Diarrhea    • Gastritis    • GERD (gastroesophageal reflux disease)    • Hidradenitis suppurativa    • Injury of back     LUMBAR 4/5   • Nausea & vomiting    • PCOS (polycystic ovarian syndrome)    • PONV (postoperative nausea and vomiting)    • Sciatica of right side    • Seasonal allergies      Past Surgical History:   Procedure Laterality Date   • ADENOIDECTOMY     • BACK SURGERY      DISCECTOMY X2 LUMBAR 5 S1   • COLONOSCOPY N/A 11/15/2022    Procedure: COLONOSCOPY WITH BIOPSIES;  Surgeon: Dimitrios Muniz MD;  Location: [facility] ENDOSCOPY;  Service: Gastroenterology;  Laterality: N/A;  NORMAL COLONOSCOPY   • ENDOSCOPY N/A 11/15/2022    Procedure: ESOPHAGOGASTRODUODENOSCOPY WITH BIOPSIES;  Surgeon: Dimitrios Muniz MD;  Location: [facility] ENDOSCOPY;  Service: Gastroenterology;  Laterality: N/A;  DUODENUM EROSIONS, HIATAL HERNIA   • LUMBAR DISCECTOMY Right 3/9/2022    Procedure: MINIMALLY INVASIVE LUMBAR DISCECTOMY, RIGHT APPROACH, LUMBAR  4-LUMBAR 5 FOR RECURRENT DISC HERNIATION;  Surgeon: Joseph Smith MD;  Location: [facility] MAIN OR;  Service: Neurosurgery;  Laterality: Right;   • TONSILLECTOMY       Family History   Problem Relation Age of Onset   • Diverticulosis Father    • Malig Hyperthermia Neg Hx    • Colon cancer Neg Hx        Home Medications:  Prior to Admission medications    Medication Sig Start Date End Date Taking? Authorizing Provider   albuterol sulfate  (90 Base) MCG/ACT inhaler Inhale 2 puffs Every 6 (Six) Hours As Needed for Wheezing. 22   Karuna Roper APRN   clindamycin-benzoyl peroxide (BenzaClin) 1-5 % gel Apply 1 application topically to the appropriate area as directed 2 (Two) Times a Day.  Patient taking differently: Apply 1 application topically to the appropriate area as directed As Needed. 5/10/22   Thu Wallis APRN   escitalopram (Lexapro) 20 MG tablet Take 1 tablet by mouth Daily. 23   Thu Wallis APRN   hydrOXYzine (ATARAX) 25 MG tablet TAKE 1 TABLET BY MOUTH THREE TIMES DAILY AS NEEDED FOR ANXIETY 22   Thu Wallis APRN   ondansetron ODT (ZOFRAN-ODT) 4 MG disintegrating tablet Place 1 tablet on the tongue 4 (Four) Times a Day As Needed for Nausea or Vomiting. 22   Josie Hess APRN   pantoprazole (PROTONIX) 20 MG EC tablet Take 1 tablet by mouth Daily.    Provider, MD [place]        Social History:   Social History     Tobacco Use   • Smoking status: Former     Packs/day: 1.00     Years: 10.00     Pack years: 10.00     Types: Cigarettes     Quit date: 2019     Years since quittin.3     Passive exposure: Never   • Smokeless tobacco: Never   • Tobacco comments:     Pt quit smoking 2019   Vaping Use   • Vaping Use: Every day   • Substances: Nicotine, INST PER ANESTHESIA PROTOCOL. Last 3/9/22 at 0800   • Devices: Disposable   Substance Use Topics   • Alcohol use: Not Currently     Comment: social   • Drug use: Yes     Types: Marijuana  "        Review of Systems:  Review of Systems   Constitutional: Negative.    HENT: Negative.    Eyes: Negative.    Respiratory: Negative.    Cardiovascular: Negative.    Gastrointestinal: Positive for abdominal pain, nausea and vomiting. Negative for diarrhea.   Endocrine: Negative.    Genitourinary: Negative.    Musculoskeletal: Negative.    Skin: Negative.    Allergic/Immunologic: Negative.    Neurological: Negative.    Hematological: Negative.    Psychiatric/Behavioral: Negative.         Physical Exam:  /80   Pulse 74   Temp 98.7 °F (37.1 °C) (Oral)   Resp 22   Ht 162.6 cm (64\")   Wt 113 kg (249 lb 9 oz)   SpO2 94%   BMI 42.84 kg/m²     Physical Exam  Vitals and nursing note reviewed.   Constitutional:       Appearance: Normal appearance. She is normal weight.   HENT:      Head: Normocephalic and atraumatic.      Nose: Nose normal.      Mouth/Throat:      Mouth: Mucous membranes are moist.   Eyes:      Extraocular Movements: Extraocular movements intact.      Pupils: Pupils are equal, round, and reactive to light.   Cardiovascular:      Rate and Rhythm: Normal rate and regular rhythm.      Heart sounds: Normal heart sounds.   Pulmonary:      Effort: Pulmonary effort is normal.      Breath sounds: Normal breath sounds.   Abdominal:      General: Abdomen is flat. Bowel sounds are normal.      Palpations: Abdomen is soft.      Tenderness: There is abdominal tenderness in the epigastric area. There is no right CVA tenderness or left CVA tenderness.   Musculoskeletal:         General: Normal range of motion.      Cervical back: Normal range of motion and neck supple.   Skin:     General: Skin is warm and dry.   Neurological:      General: No focal deficit present.      Mental Status: She is alert and oriented to person, place, and time.   Psychiatric:         Mood and Affect: Mood normal.         Behavior: Behavior normal.                  Procedures:  Procedures      Medical Decision " Making:      Comorbidities that affect care:    None    External Notes reviewed:    None      The following orders were placed and all results were independently analyzed by me:  Orders Placed This Encounter   Procedures   • CT Abdomen Pelvis With Contrast   • [place] Draw   • Comprehensive Metabolic Panel   • Lipase   • Urinalysis With Microscopic If Indicated (No Culture) - Urine, Clean Catch   • hCG, Quantitative, Pregnancy   • CBC Auto Differential   • NPO Diet NPO Type: Strict NPO   • Undress & Gown   • Insert Peripheral IV   • CBC & Differential   • Green Top (Gel)   • Lavender Top   • Gold Top - SST   • Light Blue Top       Medications Given in the Emergency Department:  Medications   sodium chloride 0.9 % flush 10 mL (has no administration in time range)   prochlorperazine (COMPAZINE) injection 10 mg (10 mg Intravenous Given 4/21/23 2145)   sodium chloride 0.9 % bolus 1,000 mL (0 mL Intravenous Stopped 4/21/23 2100)   ondansetron (ZOFRAN) injection 4 mg (4 mg Intravenous Given 4/21/23 1925)   iopamidol (ISOVUE-370) 76 % injection 100 mL (100 mL Intravenous Given 4/21/23 1947)   diphenhydrAMINE (BENADRYL) injection 25 mg (25 mg Intravenous Given 4/21/23 2030)   metoclopramide (REGLAN) injection 10 mg (10 mg Intravenous Given 4/21/23 2028)   dicyclomine (BENTYL) injection 20 mg (20 mg Intramuscular Given 4/21/23 2032)   morphine injection 4 mg (4 mg Intravenous Given 4/21/23 2145)        ED Course:    The patient was initially evaluated in the triage area where orders were placed. The patient was later dispositioned by Tushar Crowe PA-C.      The patient was advised to stay for completion of workup which includes but is not limited to communication of labs and radiological results, reassessment and plan. The patient was advised that leaving prior to disposition by a provider could result in critical findings that are not communicated to the patient.     ED Course as of 04/21/23 2239 Fri Apr 21, 2023    2236 RN states that the pain has subsided, patient was asleep when she woke her up to assess the pain.  She states she is no longer vomiting. [AJ]      ED Course User Index  [AJ] Tushar Crowe PA-C       Labs:    Lab Results (last 24 hours)     Procedure Component Value Units Date/Time    CBC & Differential [[id number]]  (Abnormal) Collected: 04/21/23 1844    Specimen: Blood Updated: 04/21/23 1854    Narrative:      The following orders were created for panel order CBC & Differential.  Procedure                               Abnormality         Status                     ---------                               -----------         ------                     CBC Auto Differential[[id number]]        Abnormal            Final result                 Please view results for these tests on the individual orders.    Comprehensive Metabolic Panel [[id number]]  (Abnormal) Collected: 04/21/23 1844    Specimen: Blood Updated: 04/21/23 1922     Glucose 126 mg/dL      BUN 14 mg/dL      Creatinine 0.92 mg/dL      Sodium 142 mmol/L      Potassium 4.1 mmol/L      Chloride 109 mmol/L      CO2 18.1 mmol/L      Calcium 10.1 mg/dL      Total Protein 7.4 g/dL      Albumin 4.3 g/dL      ALT (SGPT) 18 U/L      AST (SGOT) 19 U/L      Alkaline Phosphatase 65 U/L      Total Bilirubin 0.4 mg/dL      Globulin 3.1 gm/dL      A/G Ratio 1.4 g/dL      BUN/Creatinine Ratio 15.2     Anion Gap 14.9 mmol/L      eGFR 85.5 mL/min/1.73     Narrative:      GFR Normal >60  Chronic Kidney Disease <60  Kidney Failure <15      Lipase [[id number]]  (Normal) Collected: 04/21/23 1844    Specimen: Blood Updated: 04/21/23 1922     Lipase 14 U/L     hCG, Quantitative, Pregnancy [[id number]] Collected: 04/21/23 1844    Specimen: Blood Updated: 04/21/23 1920     HCG Quantitative <0.50 mIU/mL     Narrative:      HCG Ranges by Gestational Age    Females - non-pregnant premenopausal   </= 1mIU/mL HCG  Females - postmenopausal               </= 7mIU/mL HCG    3 Weeks        5.4   -      72 mIU/mL  4 Weeks      10.2   -     708 mIU/mL  5 Weeks       217   -   8,245 mIU/mL  6 Weeks       152   -  32,177 mIU/mL  7 Weeks     4,059   - 153,767 mIU/mL  8 Weeks    31,366   - 149,094 mIU/mL  9 Weeks    59,109   - 135,901 mIU/mL  10 Weeks   44,186   - 170,409 mIU/mL  12 Weeks   27,107   - 201,615 mIU/mL  14 Weeks   24,302   -  93,646 mIU/mL  15 Weeks   12,540   -  69,747 mIU/mL  16 Weeks    8,904   -  55,332 mIU/mL  17 Weeks    8,240   -  51,793 mIU/mL  18 Weeks    9,649   -  55,271 mIU/mL      CBC Auto Differential [[id number]]  (Abnormal) Collected: 04/21/23 1844    Specimen: Blood Updated: 04/21/23 1854     WBC 10.72 10*3/mm3      RBC 4.99 10*6/mm3      Hemoglobin 14.2 g/dL      Hematocrit 41.4 %      MCV 83.0 fL      MCH 28.5 pg      MCHC 34.3 g/dL      RDW 13.5 %      RDW-SD 40.5 fl      MPV 12.0 fL      Platelets 211 10*3/mm3      Neutrophil % 76.4 %      Lymphocyte % 19.1 %      Monocyte % 3.4 %      Eosinophil % 0.4 %      Basophil % 0.4 %      Immature Grans % 0.3 %      Neutrophils, Absolute 8.20 10*3/mm3      Lymphocytes, Absolute 2.05 10*3/mm3      Monocytes, Absolute 0.36 10*3/mm3      Eosinophils, Absolute 0.04 10*3/mm3      Basophils, Absolute 0.04 10*3/mm3      Immature Grans, Absolute 0.03 10*3/mm3      nRBC 0.0 /100 WBC     Urinalysis With Microscopic If Indicated (No Culture) - Urine, Clean Catch [[id number]]  (Abnormal) Collected: 04/21/23 2123    Specimen: Urine, Clean Catch Updated: 04/21/23 2142     Color, UA Yellow     Appearance, UA Clear     pH, UA >=9.0     Specific Gravity, UA >1.030     Glucose, UA Negative     Ketones, UA 40 mg/dL (2+)     Bilirubin, UA Negative     Blood, UA Negative     Protein, UA Trace     Leuk Esterase, UA Negative     Nitrite, UA Negative     Urobilinogen, UA 0.2 E.U./dL    Narrative:      Urine microscopic not indicated.           Imaging:    CT Abdomen Pelvis With Contrast    Result Date: 4/21/2023  PROCEDURE: CT ABDOMEN PELVIS W  CONTRAST  COMPARISON: [facility], CT, CT ABDOMEN PELVIS W CONTRAST, 3/28/2022, 18:20.  INDICATIONS: Nausea/vomiting  TECHNIQUE: CT images were created with non-ionic intravenous contrast material.   PROTOCOL:   Standard imaging protocol performed    RADIATION:   DLP: 1055.7 mGy*cm   Automated exposure control was utilized to minimize radiation dose. CONTRAST: 100 cc Isovue 370 I.V. LABS:   eGFR: >60 ml/min/1.73m2  FINDINGS:  The lung bases are clear.  The liver is of normal size and uniform density.  The gallbladder is not abnormally distended.  No pancreatic or adrenal mass is evident.  The spleen is of normal size.  The kidneys enhance bilaterally.  No renal or ureteral stones are seen.  There is no evidence of hydronephrosis.  The urinary bladder is not distended.  The uterus and ovaries have an unremarkable appearance.  The stomach is not abnormally distended.  Bowel loops are not abnormally dilated.  The anterior abdominal wall appears intact, no hernia is evident.  Degenerative changes are seen in the lumbar spine.       Negative CT scan of the abdomen and pelvis with IV contrast.     HOWARD GARCIA MD       Electronically Signed and Approved By: HOWARD GARCIA MD on 4/21/2023 at 20:06                 Differential Diagnosis and Discussion:      Abdominal Pain: Based on the patient's signs and symptoms, I considered abdominal aortic aneurysm, small bowel obstruction, pancreatitis, acute cholecystitis, acute appendecitis, peptic ulcer disease, gastritis, colitis, endocrine disorders, irritable bowel syndrome and other differential diagnosis an etiology of the patient's abdominal pain.  Vomiting: Differential diagnosis includes but is not limited to migraine, labyrinthine disorders, psychogenic, metabolic and endocrine causes, peptic ulcer, gastric outlet obstruction, gastritis, gastroenteritis, appendicitis, intestinal obstruction, paralytic ileus, food poisoning, cholecystitis, acute hepatitis,  acute pancreatitis, acute febrile illness, and myocardial infarction.    All labs were reviewed and interpreted by me.  CT scan radiology impression was interpreted by me.    MDM     Patient Care Considerations:    ANTIBIOTICS: I considered prescribing antibiotics as an outpatient however CT was negative, most likely viral      Consultants/Shared Management Plan:    None    Social Determinants of Health:    Patient is independent, reliable, and has access to care.       Disposition and Care Coordination:    Discharged: The patient is suitable and stable for discharge with no need for consideration of observation or admission.    I have explained the patient´s condition, diagnoses and treatment plan based on the information available to me at this time. I have answered questions and addressed any concerns. The patient has a good  understanding of the patient´s diagnosis, condition, and treatment plan as can be expected at this point. The vital signs have been stable. The patient´s condition is stable and appropriate for discharge from the emergency department.      The patient will pursue further outpatient evaluation with the primary care physician or other designated or consulting physician as outlined in the discharge instructions. They are agreeable to this plan of care and follow-up instructions have been explained in detail. The patient has received these instructions in written format and have expressed an understanding of the discharge instructions. The patient is aware that any significant change in condition or worsening of symptoms should prompt an immediate return to this or the closest emergency department or call to 911.  I have explained discharge medications and the need for follow up with the patient/caretakers. This was also printed in the discharge instructions. Patient was discharged with the following medications and follow up:      Medication List      New Prescriptions    dicyclomine 20 MG  tablet  Commonly known as: BENTYL  Take 1 tablet by mouth Every 6 (Six) Hours.     prochlorperazine 5 MG tablet  Commonly known as: COMPAZINE  Take 1 tablet by mouth Every 6 (Six) Hours As Needed for Nausea or Vomiting.        Changed    clindamycin-benzoyl peroxide 1-5 % gel  Commonly known as: BenzaClin  Apply 1 application topically to the appropriate area as directed 2 (Two) Times a Day.  What changed:   · when to take this  · reasons to take this           Where to Get Your Medications      These medications were sent to [id number] - [place], [place] - [address] AT [facility] OF [address] & [place] - [phone]  - [phone]   [address] [place] [address]    Phone: [phone]   · dicyclomine 20 MG tablet  · prochlorperazine 5 MG tablet      No follow-up provider specified.     Final diagnoses:   Viral gastroenteritis        ED Disposition     ED Disposition   Discharge    Condition   Stable    Comment   --             This medical record created using voice recognition software.           Tusahr Crowe PA-C  04/21/23 7338

## 2023-04-22 NOTE — DISCHARGE INSTRUCTIONS
Your electrolytes are within normal  Your CT was negative for any acute findings, you most likely have a viral stomach bug  Please take Compazine for nausea vomiting and wait 15 to 20 minutes prior to eating or drinking anything  Please drink lots of fluids such as water, Gatorade or Pedialyte and eat a bland diet until your symptoms start to improve

## 2023-04-24 ENCOUNTER — PATIENT MESSAGE (OUTPATIENT)
Dept: FAMILY MEDICINE CLINIC | Facility: CLINIC | Age: 33
End: 2023-04-24
Payer: COMMERCIAL

## 2023-04-24 NOTE — LETTER
April 25, 2023     Patient: Jenni Reid   YOB: 1990   Date of Visit:        To Whom It May Concern:    It is my medical opinion that Jenni Reid be excused from work April 18- 20,2023. If any questions or concerns please give our office a call.           Sincerely,        JEROMY Epps

## 2023-04-25 NOTE — TELEPHONE ENCOUNTER
From: Jenni Reid  To: Thu Kadi  Sent: 4/24/2023 9:23 AM EDT  Subject: ER trip    I was in the ER this weekend due to severe abdominal pains and vomiting. I actually took today off work as well and my boss would like notes for the dates missed. I left early 3 days last week because I was so sick. We found out it’s from extreme allergies and we think we have gotten it mostly under control. Can I get a doctors note for work that covers Tuesday-today? I will gladly make a video visit if needed. I didn’t know if it would be needed since you can see I was in the ER this weekend. Let me know what to do.

## 2023-05-24 ENCOUNTER — TELEPHONE (OUTPATIENT)
Dept: FAMILY MEDICINE CLINIC | Facility: CLINIC | Age: 33
End: 2023-05-24
Payer: COMMERCIAL

## 2023-05-24 DIAGNOSIS — T78.40XA ALLERGY, INITIAL ENCOUNTER: Primary | ICD-10-CM

## 2023-05-24 NOTE — TELEPHONE ENCOUNTER
The patient phoned stating that she needs a order for [facility] she has an appointment on 6/2/2023 and needs the order faxed to [facility].

## 2023-05-31 ENCOUNTER — TELEPHONE (OUTPATIENT)
Dept: FAMILY MEDICINE CLINIC | Facility: CLINIC | Age: 33
End: 2023-05-31

## 2024-01-28 ENCOUNTER — APPOINTMENT (OUTPATIENT)
Dept: GENERAL RADIOLOGY | Facility: HOSPITAL | Age: 34
End: 2024-01-28
Payer: COMMERCIAL

## 2024-01-28 ENCOUNTER — HOSPITAL ENCOUNTER (EMERGENCY)
Facility: HOSPITAL | Age: 34
Discharge: HOME | End: 2024-01-28
Attending: EMERGENCY MEDICINE | Admitting: EMERGENCY MEDICINE
Payer: COMMERCIAL

## 2024-01-28 VITALS
HEIGHT: 64 IN | OXYGEN SATURATION: 100 % | TEMPERATURE: 98.4 F | RESPIRATION RATE: 16 BRPM | SYSTOLIC BLOOD PRESSURE: 141 MMHG | DIASTOLIC BLOOD PRESSURE: 86 MMHG | BODY MASS INDEX: 42.12 KG/M2 | WEIGHT: 246.69 LBS | HEART RATE: 76 BPM

## 2024-01-28 DIAGNOSIS — R07.89 ATYPICAL CHEST PAIN: Primary | ICD-10-CM

## 2024-01-28 LAB
ALBUMIN SERPL-MCNC: 4.1 G/DL (ref 3.5–5.2)
ALBUMIN/GLOB SERPL: 1.4 G/DL
ALP SERPL-CCNC: 86 U/L (ref 39–117)
ALT SERPL W P-5'-P-CCNC: 20 U/L (ref 1–33)
ANION GAP SERPL CALCULATED.3IONS-SCNC: 12.6 MMOL/L (ref 5–15)
AST SERPL-CCNC: 18 U/L (ref 1–32)
BASOPHILS # BLD AUTO: 0.05 10*3/MM3 (ref 0–0.2)
BASOPHILS NFR BLD AUTO: 0.6 % (ref 0–1.5)
BILIRUB SERPL-MCNC: 0.2 MG/DL (ref 0–1.2)
BUN SERPL-MCNC: 16 MG/DL (ref 6–20)
BUN/CREAT SERPL: 19.3 (ref 7–25)
CALCIUM SPEC-SCNC: 9.4 MG/DL (ref 8.6–10.5)
CHLORIDE SERPL-SCNC: 108 MMOL/L (ref 98–107)
CO2 SERPL-SCNC: 20.4 MMOL/L (ref 22–29)
CREAT SERPL-MCNC: 0.83 MG/DL (ref 0.57–1)
D DIMER PPP FEU-MCNC: 0.29 MCGFEU/ML (ref 0–0.5)
DEPRECATED RDW RBC AUTO: 39.1 FL (ref 37–54)
EGFRCR SERPLBLD CKD-EPI 2021: 96.2 ML/MIN/1.73
EOSINOPHIL # BLD AUTO: 0.16 10*3/MM3 (ref 0–0.4)
EOSINOPHIL NFR BLD AUTO: 1.8 % (ref 0.3–6.2)
ERYTHROCYTE [DISTWIDTH] IN BLOOD BY AUTOMATED COUNT: 12.7 % (ref 12.3–15.4)
FLUAV SUBTYP SPEC NAA+PROBE: NOT DETECTED
FLUBV RNA ISLT QL NAA+PROBE: NOT DETECTED
GLOBULIN UR ELPH-MCNC: 3 GM/DL
GLUCOSE SERPL-MCNC: 100 MG/DL (ref 65–99)
HCT VFR BLD AUTO: 38.6 % (ref 34–46.6)
HGB BLD-MCNC: 12.8 G/DL (ref 12–15.9)
HOLD SPECIMEN: NORMAL
HOLD SPECIMEN: NORMAL
IMM GRANULOCYTES # BLD AUTO: 0.06 10*3/MM3 (ref 0–0.05)
IMM GRANULOCYTES NFR BLD AUTO: 0.7 % (ref 0–0.5)
LIPASE SERPL-CCNC: 22 U/L (ref 13–60)
LYMPHOCYTES # BLD AUTO: 2.7 10*3/MM3 (ref 0.7–3.1)
LYMPHOCYTES NFR BLD AUTO: 29.8 % (ref 19.6–45.3)
MAGNESIUM SERPL-MCNC: 2.2 MG/DL (ref 1.6–2.6)
MCH RBC QN AUTO: 28 PG (ref 26.6–33)
MCHC RBC AUTO-ENTMCNC: 33.2 G/DL (ref 31.5–35.7)
MCV RBC AUTO: 84.5 FL (ref 79–97)
MONOCYTES # BLD AUTO: 0.45 10*3/MM3 (ref 0.1–0.9)
MONOCYTES NFR BLD AUTO: 5 % (ref 5–12)
NEUTROPHILS NFR BLD AUTO: 5.63 10*3/MM3 (ref 1.7–7)
NEUTROPHILS NFR BLD AUTO: 62.1 % (ref 42.7–76)
NRBC BLD AUTO-RTO: 0 /100 WBC (ref 0–0.2)
NT-PROBNP SERPL-MCNC: <36 PG/ML (ref 0–450)
PLATELET # BLD AUTO: 244 10*3/MM3 (ref 140–450)
PMV BLD AUTO: 11.6 FL (ref 6–12)
POTASSIUM SERPL-SCNC: 3.7 MMOL/L (ref 3.5–5.2)
PROT SERPL-MCNC: 7.1 G/DL (ref 6–8.5)
RBC # BLD AUTO: 4.57 10*6/MM3 (ref 3.77–5.28)
RSV RNA NPH QL NAA+NON-PROBE: NOT DETECTED
SARS-COV-2 RNA RESP QL NAA+PROBE: NOT DETECTED
SODIUM SERPL-SCNC: 141 MMOL/L (ref 136–145)
TROPONIN T SERPL HS-MCNC: 8 NG/L
WBC NRBC COR # BLD AUTO: 9.05 10*3/MM3 (ref 3.4–10.8)
WHOLE BLOOD HOLD COAG: NORMAL
WHOLE BLOOD HOLD SPECIMEN: NORMAL

## 2024-01-28 PROCEDURE — 83880 ASSAY OF NATRIURETIC PEPTIDE: CPT | Performed by: EMERGENCY MEDICINE

## 2024-01-28 PROCEDURE — 85379 FIBRIN DEGRADATION QUANT: CPT | Performed by: EMERGENCY MEDICINE

## 2024-01-28 PROCEDURE — 99284 EMERGENCY DEPT VISIT MOD MDM: CPT

## 2024-01-28 PROCEDURE — 84484 ASSAY OF TROPONIN QUANT: CPT | Performed by: EMERGENCY MEDICINE

## 2024-01-28 PROCEDURE — 80053 COMPREHEN METABOLIC PANEL: CPT | Performed by: EMERGENCY MEDICINE

## 2024-01-28 PROCEDURE — 93005 ELECTROCARDIOGRAM TRACING: CPT

## 2024-01-28 PROCEDURE — 87637 SARSCOV2&INF A&B&RSV AMP PRB: CPT | Performed by: EMERGENCY MEDICINE

## 2024-01-28 PROCEDURE — 83690 ASSAY OF LIPASE: CPT | Performed by: EMERGENCY MEDICINE

## 2024-01-28 PROCEDURE — 85025 COMPLETE CBC W/AUTO DIFF WBC: CPT | Performed by: EMERGENCY MEDICINE

## 2024-01-28 PROCEDURE — 83735 ASSAY OF MAGNESIUM: CPT | Performed by: EMERGENCY MEDICINE

## 2024-01-28 PROCEDURE — 71045 X-RAY EXAM CHEST 1 VIEW: CPT

## 2024-01-28 PROCEDURE — 93005 ELECTROCARDIOGRAM TRACING: CPT | Performed by: EMERGENCY MEDICINE

## 2024-01-28 PROCEDURE — 36415 COLL VENOUS BLD VENIPUNCTURE: CPT | Performed by: EMERGENCY MEDICINE

## 2024-01-28 RX ORDER — SODIUM CHLORIDE 0.9 % (FLUSH) 0.9 %
10 SYRINGE (ML) INJECTION AS NEEDED
Status: DISCONTINUED | OUTPATIENT
Start: 2024-01-28 | End: 2024-01-28 | Stop reason: HOSPADM

## 2024-01-28 RX ORDER — ASPIRIN 81 MG/1
324 TABLET, CHEWABLE ORAL ONCE
Status: DISCONTINUED | OUTPATIENT
Start: 2024-01-28 | End: 2024-01-28

## 2024-01-29 NOTE — ED PROVIDER NOTES
Time: 7:30 PM EST  Date of encounter:  1/28/2024  Independent Historian/Clinical History and Information was obtained by:   Patient    History is limited by: N/A    Chief Complaint: Chest pain      History of Present Illness:  Patient is a 33 y.o. year old female who presents to the emergency department for evaluation of chest pain.  Patient states that she started having chest pain this morning around 8 AM.  States has been coming and going but is on the left side of her chest.  States it also hurts down her left arm.  She says state it hurts more when she is moving her left arm.  Denies any shortness of breath, nausea, vomiting.  Does have a history of PCOS as well as GERD in the past.  She also reports that she vapes and does have a family history of coronary artery disease.  Has not had a stress test or heart cath.  No history of blood clots.  No other complaints this time.    HPI    Patient Care Team  Primary Care Provider: Thu Wallis APRN    Past Medical History:     Allergies   Allergen Reactions    Lexapro [Escitalopram] GI Intolerance    Nickel Rash     BLISTERS/REDNESS/RASH ON SKIN    Shellfish Allergy Hives, Diarrhea, Itching, Nausea And Vomiting, Rash, Dizziness and Confusion    Zoloft [Sertraline] Other (See Comments)     Increased heat sensitivity     Past Medical History:   Diagnosis Date    Allergic     Anxiety     Depression     Diarrhea     Gastritis     GERD (gastroesophageal reflux disease)     Hidradenitis suppurativa     Injury of back     LUMBAR 4/5    Nausea & vomiting     PCOS (polycystic ovarian syndrome)     PONV (postoperative nausea and vomiting)     Sciatica of right side     Seasonal allergies      Past Surgical History:   Procedure Laterality Date    ADENOIDECTOMY      BACK SURGERY      DISCECTOMY X2 LUMBAR 5 S1    COLONOSCOPY N/A 11/15/2022    Procedure: COLONOSCOPY WITH BIOPSIES;  Surgeon: Dimitrios Muniz MD;  Location: [facility] ENDOSCOPY;  Service: Gastroenterology;   Laterality: N/A;  NORMAL COLONOSCOPY    ENDOSCOPY N/A 11/15/2022    Procedure: ESOPHAGOGASTRODUODENOSCOPY WITH BIOPSIES;  Surgeon: Dimitrios Muniz MD;  Location: [facility] ENDOSCOPY;  Service: Gastroenterology;  Laterality: N/A;  DUODENUM EROSIONS, HIATAL HERNIA    LUMBAR DISCECTOMY Right 3/9/2022    Procedure: MINIMALLY INVASIVE LUMBAR DISCECTOMY, RIGHT APPROACH, LUMBAR 4-LUMBAR 5 FOR RECURRENT DISC HERNIATION;  Surgeon: Joseph Smith MD;  Location: [facility] MAIN OR;  Service: Neurosurgery;  Laterality: Right;    TONSILLECTOMY       Family History   Problem Relation Age of Onset    Diverticulosis Father     Malig Hyperthermia Neg Hx     Colon cancer Neg Hx        Home Medications:  Prior to Admission medications    Medication Sig Start Date End Date Taking? Authorizing Provider   albuterol sulfate  (90 Base) MCG/ACT inhaler Inhale 2 puffs Every 6 (Six) Hours As Needed for Wheezing. 1/26/22   Karuna Roper APRN   ARIPiprazole (ABILIFY) 5 MG tablet Take 1 tablet by mouth Daily. 10/3/23   [place]ia Ji MD   azelastine (ASTELIN) 0.1 % nasal spray 2 sprays into the nostril(s) as directed by provider 2 (Two) Times a Day. Use in each nostril as directed 10/9/23   Dmitry Hawkins PA   cetirizine (zyrTEC) 10 MG tablet  7/3/23   [place]ia Ji MD   EPINEPHrine (EPIPEN) 0.3 MG/0.3ML solution auto-injector injection AS DIRECTED AS NEEDED FOR ACUTE ALLERGIC REACTION 6/2/23   Virginia Ji MD   fluticasone (FLONASE) 50 MCG/ACT nasal spray  6/30/23   [place]ia Ji MD   hydrOXYzine (ATARAX) 25 MG tablet TAKE 1 TABLET BY MOUTH THREE TIMES DAILY AS NEEDED FOR ANXIETY 12/28/22   Thu Wallis APRN   pantoprazole (PROTONIX) 40 MG EC tablet Take 1 tablet by mouth Daily. 9/26/23   Virginia Ji MD   Vraylar 3 MG capsule capsule Take 1 capsule by mouth Daily. 7/7/23   [place]ia Ji MD   Vyvanse 50 MG capsule Take 1 capsule by mouth Daily 11/27/23   Margy  "MD [place]   promethazine (PHENERGAN) 25 MG tablet Take 1 tablet by mouth Every 6 (Six) Hours As Needed for Nausea or Vomiting. 23  Wili De La Torre MD        Social History:   Social History     Tobacco Use    Smoking status: Former     Packs/day: 1.00     Years: 10.00     Additional pack years: 0.00     Total pack years: 10.00     Types: Cigarettes     Quit date: 2019     Years since quittin.0     Passive exposure: Never    Smokeless tobacco: Never    Tobacco comments:     Pt quit smoking 2019   Vaping Use    Vaping Use: Every day    Substances: Nicotine    Devices: Disposable   Substance Use Topics    Alcohol use: Not Currently     Comment: social    Drug use: Not Currently     Types: Marijuana         Review of Systems:  Review of Systems   Cardiovascular:  Positive for chest pain.        Physical Exam:  /88 (Patient Position: Sitting)   Pulse 104   Temp 98 °F (36.7 °C) (Oral)   Resp 18   Ht 162.6 cm (64\")   Wt 112 kg (246 lb 11.1 oz)   SpO2 100%   BMI 42.35 kg/m²     Physical Exam  Vitals and nursing note reviewed.   Constitutional:       Appearance: Normal appearance.   HENT:      Head: Normocephalic and atraumatic.   Eyes:      General: No scleral icterus.  Cardiovascular:      Rate and Rhythm: Normal rate and regular rhythm.      Heart sounds: Normal heart sounds.   Pulmonary:      Effort: Pulmonary effort is normal.      Comments: Left-sided chest wall tenderness  Chest:      Chest wall: Tenderness present.   Abdominal:      Palpations: Abdomen is soft.      Tenderness: There is no abdominal tenderness.   Musculoskeletal:         General: Normal range of motion.      Cervical back: Normal range of motion.   Skin:     Findings: No rash.   Neurological:      General: No focal deficit present.      Mental Status: She is alert.                  Procedures:  Procedures      Medical Decision Making:      Comorbidities that affect care:    Smoking, Obesity    External Notes " reviewed:    Reviewed urgent care note from 12/18/2023      The following orders were placed and all results were independently analyzed by me:  Orders Placed This Encounter   Procedures    COVID-19, FLU A/B, RSV PCR 1 HR TAT - Swab, Nasopharynx    XR Chest 1 View    [place] Draw    High Sensitivity Troponin T    Comprehensive Metabolic Panel    Lipase    BNP    Magnesium    CBC Auto Differential    D-dimer, Quantitative    NPO Diet NPO Type: Strict NPO    Undress & Gown    Continuous Pulse Oximetry    Oxygen Therapy- Nasal Cannula; Titrate 1-6 LPM Per SpO2; 90 - 95%    ECG 12 Lead ED Triage Standing Order; Chest Pain    ECG 12 Lead ED Triage Standing Order; Chest Pain    Insert Peripheral IV    CBC & Differential    Green Top (Gel)    Lavender Top    Gold Top - SST    Light Blue Top       Medications Given in the Emergency Department:  Medications   sodium chloride 0.9 % flush 10 mL (has no administration in time range)        ED Course:    ED Course as of 01/28/24 2048   Sun Jan 28, 2024 1919 EKG interpreted by me  Time: 1906  Heart rate 79  Sinus, normal QRS, normal axis, no acute ischemia [MA]      ED Course User Index  [MA] Miah Gonzalez MD       Labs:    Lab Results (last 24 hours)       Procedure Component Value Units Date/Time    High Sensitivity Troponin T [[id number]]  (Normal) Collected: 01/28/24 1902    Specimen: Blood from Arm, Right Updated: 01/28/24 1942     HS Troponin T 8 ng/L     Narrative:      High Sensitive Troponin T Reference Range:  <14.0 ng/L- Negative Female for AMI  <22.0 ng/L- Negative Male for AMI  >=14 - Abnormal Female indicating possible myocardial injury.  >=22 - Abnormal Male indicating possible myocardial injury.   Clinicians would have to utilize clinical acumen, EKG, Troponin, and serial changes to determine if it is an Acute Myocardial Infarction or myocardial injury due to an underlying chronic condition.         CBC & Differential [[id number]]  (Abnormal) Collected:  01/28/24 1902    Specimen: Blood from Arm, Right Updated: 01/28/24 1922    Narrative:      The following orders were created for panel order CBC & Differential.  Procedure                               Abnormality         Status                     ---------                               -----------         ------                     CBC Auto Differential[[id number]]        Abnormal            Final result                 Please view results for these tests on the individual orders.    Comprehensive Metabolic Panel [[id number]]  (Abnormal) Collected: 01/28/24 1902    Specimen: Blood from Arm, Right Updated: 01/28/24 1945     Glucose 100 mg/dL      BUN 16 mg/dL      Creatinine 0.83 mg/dL      Sodium 141 mmol/L      Potassium 3.7 mmol/L      Chloride 108 mmol/L      CO2 20.4 mmol/L      Calcium 9.4 mg/dL      Total Protein 7.1 g/dL      Albumin 4.1 g/dL      ALT (SGPT) 20 U/L      AST (SGOT) 18 U/L      Alkaline Phosphatase 86 U/L      Total Bilirubin 0.2 mg/dL      Globulin 3.0 gm/dL      A/G Ratio 1.4 g/dL      BUN/Creatinine Ratio 19.3     Anion Gap 12.6 mmol/L      eGFR 96.2 mL/min/1.73     Narrative:      GFR Normal >60  Chronic Kidney Disease <60  Kidney Failure <15      Lipase [[id number]]  (Normal) Collected: 01/28/24 1902    Specimen: Blood from Arm, Right Updated: 01/28/24 1945     Lipase 22 U/L     BNP [[id number]]  (Normal) Collected: 01/28/24 1902    Specimen: Blood from Arm, Right Updated: 01/28/24 1942     proBNP <36.0 pg/mL     Narrative:      This assay is used as an aid in the diagnosis of individuals suspected of having heart failure. It can be used as an aid in the diagnosis of acute decompensated heart failure (ADHF) in patients presenting with signs and symptoms of ADHF to the emergency department (ED). In addition, NT-proBNP of <300 pg/mL indicates ADHF is not likely.    Age Range Result Interpretation  NT-proBNP Concentration (pg/mL:      <50             Positive            >450                    Gray                 300-450                    Negative             <300    50-75           Positive            >900                  Gray                300-900                  Negative            <300      >75             Positive            >1800                  Gray                300-1800                  Negative            <300    Magnesium [[id number]]  (Normal) Collected: 01/28/24 1902    Specimen: Blood from Arm, Right Updated: 01/28/24 1945     Magnesium 2.2 mg/dL     CBC Auto Differential [[id number]]  (Abnormal) Collected: 01/28/24 1902    Specimen: Blood from Arm, Right Updated: 01/28/24 1922     WBC 9.05 10*3/mm3      RBC 4.57 10*6/mm3      Hemoglobin 12.8 g/dL      Hematocrit 38.6 %      MCV 84.5 fL      MCH 28.0 pg      MCHC 33.2 g/dL      RDW 12.7 %      RDW-SD 39.1 fl      MPV 11.6 fL      Platelets 244 10*3/mm3      Neutrophil % 62.1 %      Lymphocyte % 29.8 %      Monocyte % 5.0 %      Eosinophil % 1.8 %      Basophil % 0.6 %      Immature Grans % 0.7 %      Neutrophils, Absolute 5.63 10*3/mm3      Lymphocytes, Absolute 2.70 10*3/mm3      Monocytes, Absolute 0.45 10*3/mm3      Eosinophils, Absolute 0.16 10*3/mm3      Basophils, Absolute 0.05 10*3/mm3      Immature Grans, Absolute 0.06 10*3/mm3      nRBC 0.0 /100 WBC     D-dimer, Quantitative [[id number]]  (Normal) Collected: 01/28/24 1902    Specimen: Blood from Arm, Right Updated: 01/28/24 1941     D-Dimer, Quantitative 0.29 MCGFEU/mL     Narrative:      According to the assay 's published package insert, a normal (<0.50 MCGFEU/mL) D-dimer result in conjunction with a non-high clinical probability assessment, excludes deep vein thrombosis (DVT) and pulmonary embolism (PE) with high sensitivity.    D-dimer values increase with age and this can make VTE exclusion of an older population difficult. To address this, the American College of [employer], based on best available evidence and recent guidelines, recommends that clinicians use  "age-adjusted D-dimer thresholds in patients greater than 50 years of age with: a) a low probability of PE who do not meet all Pulmonary Embolism Rule Out Criteria, or b) in those with intermediate probability of PE.   The formula for an age-adjusted D-dimer cut-off is \"age/100\".  For example, a 60 year old patient would have an age-adjusted cut-off of 0.60 MCGFEU/mL and an 80 year old 0.80 MCGFEU/mL.    COVID-19, FLU A/B, RSV PCR 1 HR TAT - Swab, Nasopharynx [[id number]]  (Normal) Collected: 01/28/24 1936    Specimen: Swab from Nasopharynx Updated: 01/28/24 2023     COVID19 Not Detected     Influenza A PCR Not Detected     Influenza B PCR Not Detected     RSV, PCR Not Detected    Narrative:      Fact sheet for providers: https://www.fda.gov/media/528084/download    Fact sheet for patients: https://www.fda.gov/media/989227/download    Test performed by PCR.             Imaging:    XR Chest 1 View    Result Date: 1/28/2024  PROCEDURE: XR CHEST 1 VW  COMPARISON: 1/28/2022.  INDICATIONS: CHEST PAIN.  FINDINGS:  A single AP (or PA) upright portable chest radiograph was performed.  No cardiac enlargement is seen.  No acute infiltrate is appreciated.  No pleural effusion or pneumothorax is identified.  Chronic calcified granulomatous disease involves the chest.  External artifacts obscure detail.  No significant interval change is seen since the prior study (or studies).        No acute infiltrate is appreciated.     Please note that portions of this note were completed with a voice recognition program.  ELOISA HERNANDEZ JR, MD       Electronically Signed and Approved By: ELOISA HERNANDEZ JR, MD on 1/28/2024 at 20:23                 Differential Diagnosis and Discussion:    Chest Pain:  Based on the patient's signs and symptoms, I considered aortic dissection, myocardial infaction, pulmonary embolism, cardiac tamponade, pericarditis, pneumothorax, musculoskeletal chest pain and other differential diagnosis as an etiology of the " patient's chest pain.     All labs were reviewed and interpreted by me.  All X-rays impressions were independently interpreted by me.  EKG was interpreted by me.    MDM     Patient is a 33-year-old female who presents with complaints of left-sided chest wall pain.  States has been ongoing since around 8 AM this morning.  Reports that is also hurting down her left arm.  On exam she has some musculoskeletal pain noted.  Negative workup here including negative chest x-ray, EKG, troponin, negative D-dimer.  Low concern for PE due to negative dimer.  She is otherwise well-appearing at this time.  Will have the patient follow-up as an outpatient.          Patient Care Considerations:          Consultants/Shared Management Plan:    None    Social Determinants of Health:    Patient has presented with family members who are responsible, reliable and will ensure follow up care.      Disposition and Care Coordination:    Discharged: The patient is suitable and stable for discharge with no need for consideration of admission.    I have explained the patient´s condition, diagnoses and treatment plan based on the information available to me at this time. I have answered questions and addressed any concerns. The patient has a good  understanding of the patient´s diagnosis, condition, and treatment plan as can be expected at this point. The vital signs have been stable. The patient´s condition is stable and appropriate for discharge from the emergency department.      The patient will pursue further outpatient evaluation with the primary care physician or other designated or consulting physician as outlined in the discharge instructions. They are agreeable to this plan of care and follow-up instructions have been explained in detail. The patient has received these instructions in written format and have expressed an understanding of the discharge instructions. The patient is aware that any significant change in condition or  worsening of symptoms should prompt an immediate return to this or the closest emergency department or call to 911.      Final diagnoses:   Atypical chest pain        ED Disposition       ED Disposition   Discharge    Condition   Stable    Comment   --               This medical record created using voice recognition software.             Miah Gonzalez MD  01/28/24 9572

## 2024-01-30 LAB
QT INTERVAL: 361 MS
QTC INTERVAL: 414 MS

## 2024-02-12 ENCOUNTER — OFFICE VISIT (OUTPATIENT)
Dept: FAMILY MEDICINE CLINIC | Facility: CLINIC | Age: 34
End: 2024-02-12
Payer: COMMERCIAL

## 2024-02-12 VITALS
OXYGEN SATURATION: 98 % | BODY MASS INDEX: 43.02 KG/M2 | TEMPERATURE: 97.7 F | DIASTOLIC BLOOD PRESSURE: 92 MMHG | HEART RATE: 68 BPM | WEIGHT: 252 LBS | HEIGHT: 64 IN | SYSTOLIC BLOOD PRESSURE: 142 MMHG

## 2024-02-12 DIAGNOSIS — R07.89 CHEST PRESSURE: ICD-10-CM

## 2024-02-12 DIAGNOSIS — G44.219 EPISODIC TENSION-TYPE HEADACHE, NOT INTRACTABLE: ICD-10-CM

## 2024-02-12 DIAGNOSIS — I10 PRIMARY HYPERTENSION: Primary | ICD-10-CM

## 2024-02-12 DIAGNOSIS — J30.2 SEASONAL ALLERGIC RHINITIS, UNSPECIFIED TRIGGER: ICD-10-CM

## 2024-02-12 DIAGNOSIS — Z23 NEED FOR INFLUENZA VACCINATION: ICD-10-CM

## 2024-02-12 PROCEDURE — 99214 OFFICE O/P EST MOD 30 MIN: CPT

## 2024-02-12 PROCEDURE — 1160F RVW MEDS BY RX/DR IN RCRD: CPT

## 2024-02-12 PROCEDURE — 90686 IIV4 VACC NO PRSV 0.5 ML IM: CPT

## 2024-02-12 PROCEDURE — 1159F MED LIST DOCD IN RCRD: CPT

## 2024-02-12 PROCEDURE — 3077F SYST BP >= 140 MM HG: CPT

## 2024-02-12 PROCEDURE — 3080F DIAST BP >= 90 MM HG: CPT

## 2024-02-12 PROCEDURE — 90471 IMMUNIZATION ADMIN: CPT

## 2024-02-12 RX ORDER — NIFEDIPINE 30 MG/1
30 TABLET, EXTENDED RELEASE ORAL DAILY
Qty: 30 TABLET | Refills: 2 | Status: SHIPPED | OUTPATIENT
Start: 2024-02-12

## 2024-02-12 RX ORDER — CETIRIZINE HYDROCHLORIDE 10 MG/1
10 TABLET ORAL DAILY
Qty: 90 TABLET | Refills: 3 | Status: SHIPPED | OUTPATIENT
Start: 2024-02-12

## 2024-02-26 ENCOUNTER — OFFICE VISIT (OUTPATIENT)
Dept: FAMILY MEDICINE CLINIC | Facility: CLINIC | Age: 34
End: 2024-02-26
Payer: COMMERCIAL

## 2024-02-26 VITALS
DIASTOLIC BLOOD PRESSURE: 88 MMHG | OXYGEN SATURATION: 97 % | SYSTOLIC BLOOD PRESSURE: 132 MMHG | BODY MASS INDEX: 43.43 KG/M2 | WEIGHT: 254.4 LBS | TEMPERATURE: 98.4 F | HEART RATE: 86 BPM | HEIGHT: 64 IN

## 2024-02-26 DIAGNOSIS — I10 PRIMARY HYPERTENSION: Primary | ICD-10-CM

## 2024-02-26 PROCEDURE — 1160F RVW MEDS BY RX/DR IN RCRD: CPT

## 2024-02-26 PROCEDURE — 1159F MED LIST DOCD IN RCRD: CPT

## 2024-02-26 PROCEDURE — 3079F DIAST BP 80-89 MM HG: CPT

## 2024-02-26 PROCEDURE — 99213 OFFICE O/P EST LOW 20 MIN: CPT

## 2024-02-26 PROCEDURE — 3075F SYST BP GE 130 - 139MM HG: CPT

## 2024-02-26 NOTE — PROGRESS NOTES
"Jenni Reid presents to [facility] who presents to clinic for 2-week follow-up.      History of Present Illness  This is a 33-year-old female who presents to clinic for 2-week follow-up.    Hypertension: Patient states that she is doing really well on Procardia, was started on this about 2 weeks ago, blood pressure at that time was running in the 140s over 90s, today is at 132/88.  Patient states it has been running much better at home as well, and that she is taking the medication at night because it does make her little drowsy but otherwise well-tolerated.  Patient states that she is no longer having any other symptoms, states that she feels great, definitely can tell that it is working better for her.  Denies chest pain or shortness of breath.    Patient also still waiting to see if she could possibly be pregnant, they did do insemination a few weeks ago, and states that she is due for her menstrual cycle next week, so she will test at that time as well.    The following portions of the patient's history were personally reviewed and updated as appropriate: allergies, current medications, past medical history, past surgical history, past family history, and past social history.       Objective   Vital Signs:   /88 (BP Location: Left arm, Patient Position: Sitting, Cuff Size: Adult)   Pulse 86   Temp 98.4 °F (36.9 °C) (Temporal)   Ht 162.6 cm (64\")   Wt 115 kg (254 lb 6.4 oz)   SpO2 97%   BMI 43.67 kg/m²     Body mass index is 43.67 kg/m².    All labs, imaging, test results, and specialty provider notes reviewed with patient.     Physical Exam  Vitals reviewed.   Constitutional:       Appearance: Normal appearance.   Cardiovascular:      Rate and Rhythm: Normal rate and regular rhythm.      Pulses: Normal pulses.      Heart sounds: Normal heart sounds.   Pulmonary:      Effort: Pulmonary effort is normal.      Breath sounds: Normal breath sounds.   Neurological:      " General: No focal deficit present.      Mental Status: She is alert and oriented to person, place, and time.                Assessment and Plan:  Diagnoses and all orders for this visit:    1. Primary hypertension (Primary)      Doing much better since adding nifedipine, we will continue on this at current dosage.  Patient to continue to monitor blood pressure periodically at home.  Low-salt diet and increased exercise encouraged.  May need to make further adjustments in the future, patient already has neck scheduled appointment in April, we will continue with that.  Strict ER precautions for any new or worsening symptoms.    Follow Up:  No follow-ups on file.    Patient was given instructions and counseling regarding her condition or for health maintenance advice. Please see specific information pulled into the AVS if appropriate.

## 2024-03-04 ENCOUNTER — TELEMEDICINE (OUTPATIENT)
Dept: FAMILY MEDICINE CLINIC | Facility: TELEHEALTH | Age: 34
End: 2024-03-04
Payer: COMMERCIAL

## 2024-03-04 VITALS — TEMPERATURE: 98.3 F | HEART RATE: 93 BPM

## 2024-03-04 DIAGNOSIS — J06.9 URI, ACUTE: ICD-10-CM

## 2024-03-04 DIAGNOSIS — J02.9 PHARYNGITIS, UNSPECIFIED ETIOLOGY: Primary | ICD-10-CM

## 2024-03-04 NOTE — PROGRESS NOTES
You have chosen to receive care through a telehealth visit.  Do you consent to use a video/audio connection for your medical care today? Yes     HPI  Jenni Reid is a 33 y.o. female  presents with complaint of sore throat, head and nasal congestion, ,bilateral ear pain and severe headache for 3 days. She is taking Tylenol for headache and fever. She is unsure how high her temperature has been but she reports sweats and chills.     Review of Systems   Constitutional:  Positive for activity change, appetite change, chills, fatigue and fever.   HENT:  Positive for congestion, postnasal drip, rhinorrhea, sinus pressure and sore throat.    Eyes: Negative.    Respiratory:  Positive for cough.    Cardiovascular: Negative.    Gastrointestinal: Negative.    Musculoskeletal: Negative.    Allergic/Immunologic: Positive for environmental allergies.   Neurological:  Positive for headaches.   Hematological: Negative.    Psychiatric/Behavioral: Negative.         Past Medical History:   Diagnosis Date    ADHD (attention deficit hyperactivity disorder) Na    I think I have undiagnosed ADHD    Allergic     Anxiety     Depression     Diarrhea     Gastritis     GERD (gastroesophageal reflux disease)     Hidradenitis suppurativa     HL (hearing loss) 2017    Injury of back     LUMBAR 4/5    Low back pain 04/2016    Nausea & vomiting     Obesity 2016    PCOS (polycystic ovarian syndrome)     Peptic ulceration 2022    PONV (postoperative nausea and vomiting)     Sciatica of right side     Seasonal allergies        Family History   Problem Relation Age of Onset    Diverticulosis Father     Anxiety disorder Father         Severe anxiety    Depression Father     Hearing loss Father     Diabetes Mother     Thyroid disease Mother     Early death Maternal Grandfather     Thyroid disease Sister     Thyroid disease Sister     Malig Hyperthermia Neg Hx     Colon cancer Neg Hx        Social History     Socioeconomic History    Marital status:  Significant Other   Tobacco Use    Smoking status: Former     Current packs/day: 0.00     Average packs/day: 1 pack/day for 10.2 years (10.2 ttl pk-yrs)     Types: Cigarettes     Start date: 2009     Quit date: 10/17/2019     Years since quittin.3     Passive exposure: Never    Smokeless tobacco: Never    Tobacco comments:     Pt quit smoking 2019   Vaping Use    Vaping status: Every Day    Substances: Nicotine    Devices: Disposable   Substance and Sexual Activity    Alcohol use: Not Currently     Comment: social    Drug use: Never     Types: Marijuana    Sexual activity: Yes     Partners: Female     Birth control/protection: Other         Pulse 93   Temp 98.3 °F (36.8 °C)     PHYSICAL EXAM  Physical Exam   Constitutional: She is oriented to person, place, and time. She appears well-developed and well-nourished. She does not have a sickly appearance. She does not appear ill. No distress.   HENT:   Head: Normocephalic and atraumatic.   Right Ear: Hearing normal.   Left Ear: Hearing normal.   Nose: Mucosal edema, rhinorrhea and congestion present. Right sinus exhibits maxillary sinus tenderness. Right sinus exhibits no frontal sinus tenderness. Left sinus exhibits maxillary sinus tenderness. Left sinus exhibits no frontal sinus tenderness.   Pulmonary/Chest: Effort normal.  No respiratory distress.  Neurological: She is alert and oriented to person, place, and time.   Psychiatric: She has a normal mood and affect.   Vitals reviewed.      Diagnoses and all orders for this visit:    1. Pharyngitis, unspecified etiology (Primary)  -     POC Strep A, PCR (Roche Sheng); Future    2. URI, acute  -     POC Strep A, PCR (Roche Sheng); Future  -     SHENG FLU + SARS PCR; Future    Poc test all negative.   Supportive care with increase fluids and rest.   IBU as directed prn sore throat pain.   Warm salt water gargles and warm tea with lemon and honey.       FOLLOW-UP  As discussed during visit with [facility], if  symptoms worsen or fail to improve, follow-up with PCP/Urgent Care/Emergency Department.    Patient verbalizes understanding of medications, instructions for treatment and follow-up.    Pooja Jacobo, APRN  03/04/2024  12:30 EST    The use of a video visit has been reviewed with the patient and verbal informed consent has been obtained. Myself and Jenni Reid participated in this visit. The patient is located in [facility], and I am located in [place], [place]. MyChart and Tyto  were utilized.

## 2024-03-04 NOTE — LETTER
March 4, 2024     Patient: Jenni Reid   YOB: 1990   Date of Visit: 3/4/2024       To Whom it May Concern:    Jenni Reid was seen in my clinic on 3/4/2024. She  may return to work on Thursday 3/7/2024.            Sincerely,          JEROMY Hdez        CC: No Recipients

## 2024-03-15 ENCOUNTER — TELEPHONE (OUTPATIENT)
Dept: FAMILY MEDICINE CLINIC | Facility: CLINIC | Age: 34
End: 2024-03-15
Payer: COMMERCIAL

## 2024-03-15 DIAGNOSIS — N91.2 AMENORRHEA: Primary | ICD-10-CM

## 2024-03-15 NOTE — TELEPHONE ENCOUNTER
Patient wanting to know if moy could put in labs to see if patient is pregnant. Patient aware moy it out of office today but when and if labs are placed someone would call her to get them done.

## 2024-03-18 ENCOUNTER — LAB (OUTPATIENT)
Dept: LAB | Facility: HOSPITAL | Age: 34
End: 2024-03-18
Payer: COMMERCIAL

## 2024-03-18 DIAGNOSIS — R10.13 EPIGASTRIC PAIN: ICD-10-CM

## 2024-03-18 DIAGNOSIS — R11.2 NAUSEA AND VOMITING, UNSPECIFIED VOMITING TYPE: ICD-10-CM

## 2024-03-18 DIAGNOSIS — Z13.220 SCREENING FOR LIPID DISORDERS: ICD-10-CM

## 2024-03-18 DIAGNOSIS — N91.2 AMENORRHEA: ICD-10-CM

## 2024-03-18 DIAGNOSIS — Z01.89 ROUTINE LAB DRAW: ICD-10-CM

## 2024-03-18 LAB
ALBUMIN SERPL-MCNC: 4.2 G/DL (ref 3.5–5.2)
ALBUMIN/GLOB SERPL: 1.4 G/DL
ALP SERPL-CCNC: 70 U/L (ref 39–117)
ALT SERPL W P-5'-P-CCNC: 28 U/L (ref 1–33)
ANION GAP SERPL CALCULATED.3IONS-SCNC: 11.1 MMOL/L (ref 5–15)
AST SERPL-CCNC: 21 U/L (ref 1–32)
BASOPHILS # BLD AUTO: 0.04 10*3/MM3 (ref 0–0.2)
BASOPHILS NFR BLD AUTO: 0.5 % (ref 0–1.5)
BILIRUB SERPL-MCNC: 0.2 MG/DL (ref 0–1.2)
BILIRUB UR QL STRIP: NEGATIVE
BUN SERPL-MCNC: 10 MG/DL (ref 6–20)
BUN/CREAT SERPL: 15.4 (ref 7–25)
CALCIUM SPEC-SCNC: 9.7 MG/DL (ref 8.6–10.5)
CHLORIDE SERPL-SCNC: 104 MMOL/L (ref 98–107)
CHOLEST SERPL-MCNC: 178 MG/DL (ref 0–200)
CLARITY UR: CLEAR
CO2 SERPL-SCNC: 22.9 MMOL/L (ref 22–29)
COLOR UR: YELLOW
CREAT SERPL-MCNC: 0.65 MG/DL (ref 0.57–1)
DEPRECATED RDW RBC AUTO: 38.8 FL (ref 37–54)
EGFRCR SERPLBLD CKD-EPI 2021: 120.1 ML/MIN/1.73
EOSINOPHIL # BLD AUTO: 0.14 10*3/MM3 (ref 0–0.4)
EOSINOPHIL NFR BLD AUTO: 1.7 % (ref 0.3–6.2)
ERYTHROCYTE [DISTWIDTH] IN BLOOD BY AUTOMATED COUNT: 12.7 % (ref 12.3–15.4)
GLOBULIN UR ELPH-MCNC: 2.9 GM/DL
GLUCOSE SERPL-MCNC: 94 MG/DL (ref 65–99)
GLUCOSE UR STRIP-MCNC: NEGATIVE MG/DL
HCG INTACT+B SERPL-ACNC: <1 MIU/ML
HCT VFR BLD AUTO: 40.5 % (ref 34–46.6)
HDLC SERPL-MCNC: 58 MG/DL (ref 40–60)
HGB BLD-MCNC: 13.3 G/DL (ref 12–15.9)
HGB UR QL STRIP.AUTO: NEGATIVE
HOLD SPECIMEN: NORMAL
IMM GRANULOCYTES # BLD AUTO: 0.12 10*3/MM3 (ref 0–0.05)
IMM GRANULOCYTES NFR BLD AUTO: 1.5 % (ref 0–0.5)
KETONES UR QL STRIP: NEGATIVE
LDLC SERPL CALC-MCNC: 95 MG/DL (ref 0–100)
LDLC/HDLC SERPL: 1.56 {RATIO}
LEUKOCYTE ESTERASE UR QL STRIP.AUTO: NEGATIVE
LIPASE SERPL-CCNC: 17 U/L (ref 13–60)
LYMPHOCYTES # BLD AUTO: 1.94 10*3/MM3 (ref 0.7–3.1)
LYMPHOCYTES NFR BLD AUTO: 24 % (ref 19.6–45.3)
MCH RBC QN AUTO: 27.7 PG (ref 26.6–33)
MCHC RBC AUTO-ENTMCNC: 32.8 G/DL (ref 31.5–35.7)
MCV RBC AUTO: 84.2 FL (ref 79–97)
MONOCYTES # BLD AUTO: 0.4 10*3/MM3 (ref 0.1–0.9)
MONOCYTES NFR BLD AUTO: 5 % (ref 5–12)
NEUTROPHILS NFR BLD AUTO: 5.43 10*3/MM3 (ref 1.7–7)
NEUTROPHILS NFR BLD AUTO: 67.3 % (ref 42.7–76)
NITRITE UR QL STRIP: NEGATIVE
NRBC BLD AUTO-RTO: 0 /100 WBC (ref 0–0.2)
PH UR STRIP.AUTO: 6 [PH] (ref 5–8)
PLATELET # BLD AUTO: 239 10*3/MM3 (ref 140–450)
PMV BLD AUTO: 11.4 FL (ref 6–12)
POTASSIUM SERPL-SCNC: 4.2 MMOL/L (ref 3.5–5.2)
PROT SERPL-MCNC: 7.1 G/DL (ref 6–8.5)
PROT UR QL STRIP: NEGATIVE
RBC # BLD AUTO: 4.81 10*6/MM3 (ref 3.77–5.28)
SODIUM SERPL-SCNC: 138 MMOL/L (ref 136–145)
SP GR UR STRIP: 1.02 (ref 1–1.03)
TRIGL SERPL-MCNC: 147 MG/DL (ref 0–150)
TSH SERPL DL<=0.05 MIU/L-ACNC: 2.71 UIU/ML (ref 0.27–4.2)
UROBILINOGEN UR QL STRIP: NORMAL
VLDLC SERPL-MCNC: 25 MG/DL (ref 5–40)
WBC NRBC COR # BLD AUTO: 8.07 10*3/MM3 (ref 3.4–10.8)

## 2024-03-18 PROCEDURE — 80053 COMPREHEN METABOLIC PANEL: CPT

## 2024-03-18 PROCEDURE — 80061 LIPID PANEL: CPT

## 2024-03-18 PROCEDURE — 36415 COLL VENOUS BLD VENIPUNCTURE: CPT

## 2024-03-18 PROCEDURE — 85025 COMPLETE CBC W/AUTO DIFF WBC: CPT

## 2024-03-18 PROCEDURE — 81003 URINALYSIS AUTO W/O SCOPE: CPT

## 2024-03-18 PROCEDURE — 84443 ASSAY THYROID STIM HORMONE: CPT

## 2024-03-18 PROCEDURE — 84702 CHORIONIC GONADOTROPIN TEST: CPT

## 2024-03-18 PROCEDURE — 83690 ASSAY OF LIPASE: CPT

## 2024-03-19 DIAGNOSIS — N91.2 AMENORRHEA: Primary | ICD-10-CM

## 2024-05-13 ENCOUNTER — OFFICE VISIT (OUTPATIENT)
Dept: OBSTETRICS AND GYNECOLOGY | Facility: CLINIC | Age: 34
End: 2024-05-13
Payer: COMMERCIAL

## 2024-05-13 VITALS
DIASTOLIC BLOOD PRESSURE: 102 MMHG | HEART RATE: 79 BPM | BODY MASS INDEX: 48.65 KG/M2 | HEIGHT: 64 IN | SYSTOLIC BLOOD PRESSURE: 146 MMHG | WEIGHT: 285 LBS

## 2024-05-13 DIAGNOSIS — N91.1 SECONDARY AMENORRHEA: Primary | ICD-10-CM

## 2024-05-13 PROCEDURE — 3077F SYST BP >= 140 MM HG: CPT | Performed by: NURSE PRACTITIONER

## 2024-05-13 PROCEDURE — 99213 OFFICE O/P EST LOW 20 MIN: CPT | Performed by: NURSE PRACTITIONER

## 2024-05-13 PROCEDURE — 3080F DIAST BP >= 90 MM HG: CPT | Performed by: NURSE PRACTITIONER

## 2024-05-13 PROCEDURE — 1159F MED LIST DOCD IN RCRD: CPT | Performed by: NURSE PRACTITIONER

## 2024-05-13 PROCEDURE — 1160F RVW MEDS BY RX/DR IN RCRD: CPT | Performed by: NURSE PRACTITIONER

## 2024-05-13 NOTE — PROGRESS NOTES
"GYN Visit    CC:   Chief Complaint   Patient presents with    New Patient     The patient would like to discuss having labs and possibly an ultrasound done due to negative pregnancy tests but no cycle since     Menstrual Problem     The pt was artificially inseminated in January and has not had a cycle since then but never has had a positive pregnancy test       HPI:   33 y.o. Contraception or HRT: Contraception:  Female partner, No Contraceptive Needed    Did artifical insemination at home in January.  No cycle since.  Home pregnancy tests have been negative.   Prior to this her cycle has been regular, every 28-30 days, 3-5 days.  Heavy flow, moderate cramping on the first day.  Has been regular for the past year.  Before that she would occasionally skip a month but has never gone this long without a period.  Last cycle end of December/first of January.  Negative beta HCG on 3/18/24    History: PMHx, Meds, Allergies, PSHx, Social Hx, and POBHx all reviewed and updated.    Review of Systems   Constitutional: Negative.    Genitourinary:  Positive for amenorrhea.       PHYSICAL EXAM:  BP (!) 146/102   Pulse 79   Ht 162.6 cm (64\")   Wt 129 kg (285 lb)   Breastfeeding No   BMI 48.92 kg/m²      Physical Exam  Vitals and nursing note reviewed.   Constitutional:       Appearance: Normal appearance. She is well-developed and well-groomed.   Neurological:      Mental Status: She is alert.   Psychiatric:         Attention and Perception: Attention and perception normal.         Mood and Affect: Affect normal.         Speech: Speech normal.         Behavior: Behavior is cooperative.         Cognition and Memory: Cognition normal.         ASSESSMENT AND PLAN:  Diagnoses and all orders for this visit:    1. Secondary amenorrhea (Primary)  Assessment & Plan:  Reports her last cycle was end of December/first of January, none sense.  Was regular until this time.  Did inseminated herself at home in " January, all home pregnancy tests have been negative.  Also had negative beta HCG in March.  Recent TSH normal.  Will check additional labs and ultrasound.    Orders:  -     Hemoglobin A1c  -     Follicle Stimulating Hormone  -     Antimullerian Hormone (AMH)  -     Sex Horm Binding Globulin  -     US Non-ob Transvaginal; Future  -     hCG, Quantitative, Pregnancy        Counseling:  Monitor for bleeding, call with concerns.  Discussed elevated BP today, states takes her BP medication at night, will continue to monitor and follow up with PCP as needed.    Follow Up:  Return for Follow up after ultrasound.      Adam Griffin, APRN  05/13/2024    [facility] [place] [facility] [address]  [facility]  [address] [place] [address]  Dept: [phone]  Dept Fax: [phone]  Loc: [phone]

## 2024-05-13 NOTE — ASSESSMENT & PLAN NOTE
Reports her last cycle was end of December/first of January, none sense.  Was regular until this time.  Did inseminated herself at home in January, all home pregnancy tests have been negative.  Also had negative beta HCG in March.  Recent TSH normal.  Will check additional labs and ultrasound.

## 2024-05-14 ENCOUNTER — PATIENT ROUNDING (BHMG ONLY) (OUTPATIENT)
Dept: OBSTETRICS AND GYNECOLOGY | Facility: CLINIC | Age: 34
End: 2024-05-14
Payer: COMMERCIAL

## 2024-05-14 NOTE — PROGRESS NOTES
A My-Chart message has been sent to the patient for PATIENT ROUNDING with [facility] [place].

## 2024-05-21 DIAGNOSIS — I10 PRIMARY HYPERTENSION: ICD-10-CM

## 2024-05-21 RX ORDER — NIFEDIPINE 30 MG/1
30 TABLET, EXTENDED RELEASE ORAL DAILY
Qty: 30 TABLET | Refills: 2 | Status: SHIPPED | OUTPATIENT
Start: 2024-05-21

## 2024-05-23 ENCOUNTER — HOSPITAL ENCOUNTER (OUTPATIENT)
Dept: ULTRASOUND IMAGING | Facility: HOSPITAL | Age: 34
Discharge: HOME | End: 2024-05-23
Admitting: NURSE PRACTITIONER
Payer: COMMERCIAL

## 2024-05-23 DIAGNOSIS — N91.1 SECONDARY AMENORRHEA: ICD-10-CM

## 2024-05-23 PROCEDURE — 76830 TRANSVAGINAL US NON-OB: CPT

## 2024-06-17 ENCOUNTER — TELEPHONE (OUTPATIENT)
Dept: OBSTETRICS AND GYNECOLOGY | Facility: CLINIC | Age: 34
End: 2024-06-17
Payer: COMMERCIAL

## 2024-08-01 ENCOUNTER — OFFICE VISIT (OUTPATIENT)
Dept: OBSTETRICS AND GYNECOLOGY | Facility: CLINIC | Age: 34
End: 2024-08-01
Payer: COMMERCIAL

## 2024-08-01 VITALS
WEIGHT: 293 LBS | HEIGHT: 64 IN | BODY MASS INDEX: 50.02 KG/M2 | DIASTOLIC BLOOD PRESSURE: 97 MMHG | HEART RATE: 87 BPM | SYSTOLIC BLOOD PRESSURE: 139 MMHG

## 2024-08-01 DIAGNOSIS — Z32.00 ENCOUNTER FOR PREGNANCY TEST, RESULT UNKNOWN: Primary | ICD-10-CM

## 2024-08-01 LAB — HCG INTACT+B SERPL-ACNC: <1 MIU/ML

## 2024-08-01 PROCEDURE — 84702 CHORIONIC GONADOTROPIN TEST: CPT | Performed by: NURSE PRACTITIONER

## 2024-08-01 RX ORDER — LISDEXAMFETAMINE DIMESYLATE 60 MG/1
1 CAPSULE ORAL DAILY
COMMUNITY
Start: 2024-06-18

## 2024-08-01 RX ORDER — ARIPIPRAZOLE 5 MG/1
1 TABLET ORAL DAILY
COMMUNITY
Start: 2024-04-16

## 2024-08-01 NOTE — PROGRESS NOTES
"GYN Visit    CC:   Chief Complaint   Patient presents with    Follow-up     Believes had miscarriage at home last period 2024-24   Bleeding started on 24 and passed something   Spoke to on call doctor and they suggested on phone may have M/C.       HPI:   33 y.o. Contraception or HRT: Contraception:  None    Patient is here with concerns today.    Had a cycle in , did artifical insemination at home on .  Started bleeding two weeks after the insemination and passed tissue.  States was like the worst pain of her life.   Never had a positive pregnancy test, thought it was too early to test.   Bleed for about 6 days.   Stopped bleeding 7/3/24, no pain or cramping since.   Has not started another cycle.   States her blood type is O positive.     Spoke to the on-call provider who suspected early miscarriage.  History: PMHx, Meds, Allergies, PSHx, Social Hx, and POBHx all reviewed and updated.    Review of Systems   Constitutional: Negative.    Genitourinary: Negative.        PHYSICAL EXAM:  /97   Pulse 87   Ht 162.6 cm (64\")   Wt (!) 137 kg (302 lb)   LMP 2024   BMI 51.84 kg/m²      Physical Exam  Vitals and nursing note reviewed.   Constitutional:       Appearance: Normal appearance. She is well-developed and well-groomed.   Neurological:      Mental Status: She is alert.   Psychiatric:         Attention and Perception: Attention and perception normal.         Mood and Affect: Affect normal.         Speech: Speech normal.         Behavior: Behavior is cooperative.         Cognition and Memory: Cognition normal.         ASSESSMENT AND PLAN:  Diagnoses and all orders for this visit:    1. Encounter for pregnancy test, result unknown (Primary)  -     hCG, Quantitative, Pregnancy        Counseling:  Discussed possible SAB, will check beta HCG today.  IF negative, recommend short course of progesterone to induce cycle if does not start in 2-3 weeks.  If positive, will " repeat in 48 hours.     Follow Up:  Return for as needed.        Adam Griffin, APRN  08/01/2024    [facility] [place] [facility] [address]  [facility]  [address] [place] [address]  Dept: [phone]  Dept Fax: [phone]  Loc: [phone]

## 2024-08-14 ENCOUNTER — TELEPHONE (OUTPATIENT)
Dept: OBSTETRICS AND GYNECOLOGY | Facility: CLINIC | Age: 34
End: 2024-08-14

## 2024-08-14 DIAGNOSIS — N91.1 SECONDARY AMENORRHEA: Primary | ICD-10-CM

## 2024-08-14 RX ORDER — MEDROXYPROGESTERONE ACETATE 10 MG/1
10 TABLET ORAL DAILY
Qty: 10 TABLET | Refills: 0 | Status: SHIPPED | OUTPATIENT
Start: 2024-08-14 | End: 2024-08-24

## 2024-08-14 NOTE — TELEPHONE ENCOUNTER
Patient voiced was advised to call office if does not start cycle with in 2-3 weeks it has been two weeks would like progesterone sent in to induce cycle. Please send in medication for patient.

## 2024-10-04 ENCOUNTER — TELEPHONE (OUTPATIENT)
Dept: FAMILY MEDICINE CLINIC | Facility: CLINIC | Age: 34
End: 2024-10-04

## 2024-10-07 ENCOUNTER — TELEPHONE (OUTPATIENT)
Dept: FAMILY MEDICINE CLINIC | Facility: CLINIC | Age: 34
End: 2024-10-07

## 2024-10-07 NOTE — TELEPHONE ENCOUNTER
LVMTCB    Patient missed their appointment scheduled on 10/7 with Thu Wallis    Would patient like to be rescheduled?    No show letter sent to patient either via mychart or mail.     HUB TO SHARE AND RELAY

## 2024-11-06 ENCOUNTER — TELEPHONE (OUTPATIENT)
Dept: OBSTETRICS AND GYNECOLOGY | Facility: CLINIC | Age: 34
End: 2024-11-06

## 2024-11-06 NOTE — TELEPHONE ENCOUNTER
Patient called voiced was prescribed progesterone on 08/14/24 to try to help get cycle started patient still has not started cycle would like progesterone refill if possible or do you want to see patient in clinic? Patient aware you are not in office until 11/11/24.

## (undated) DEVICE — STERILE POLYISOPRENE POWDER-FREE SURGICAL GLOVES WITH EMOLLIENT COATING: Brand: PROTEXIS

## (undated) DEVICE — LAMINECTOMY CERVICAL DISC-LF: Brand: MEDLINE INDUSTRIES, INC.

## (undated) DEVICE — LINER SURG CANSTR SXN S/RIGD 1500CC

## (undated) DEVICE — SOLIDIFIER LIQLOC PLS 1500CC BT

## (undated) DEVICE — HEMOST ABS SURGIFOAM SZ12/7 2X6 7MM
Type: IMPLANTABLE DEVICE | Site: SPINE LUMBAR | Status: NON-FUNCTIONAL
Removed: 2022-03-09

## (undated) DEVICE — Device

## (undated) DEVICE — STERILE POLYISOPRENE POWDER-FREE SURGICAL GLOVES: Brand: PROTEXIS

## (undated) DEVICE — BLCK/BITE BLOX WO/DENTL/RIM W/STRAP 54F

## (undated) DEVICE — SUT VIC 0/0 UR6 27IN DYED J603H

## (undated) DEVICE — CONN JET HYDRA H20 AUXILIARY DISP

## (undated) DEVICE — Device: Brand: DEFENDO AIR/WATER/SUCTION AND BIOPSY VALVE

## (undated) DEVICE — ANTIBACTERIAL UNDYED BRAIDED (POLYGLACTIN 910), SYNTHETIC ABSORBABLE SUTURE: Brand: COATED VICRYL

## (undated) DEVICE — DRP MICROSCP LECIA W/CLEARLENS 137X381CM

## (undated) DEVICE — SINGLE-USE BIOPSY FORCEPS: Brand: RADIAL JAW 4

## (undated) DEVICE — SOL IRRG H2O PL/BG 1000ML STRL

## (undated) DEVICE — GAMMEX® NON-LATEX SIZE 7.5, STERILE NEOPRENE POWDER-FREE SURGICAL GLOVE: Brand: GAMMEX

## (undated) DEVICE — SLV SCD KN/LEN ADJ EXPRSS BLENDED MD 1P/U

## (undated) DEVICE — GLV SURG BIOGEL LTX PF 7 1/2